# Patient Record
Sex: FEMALE | Race: WHITE | NOT HISPANIC OR LATINO | Employment: FULL TIME | URBAN - METROPOLITAN AREA
[De-identification: names, ages, dates, MRNs, and addresses within clinical notes are randomized per-mention and may not be internally consistent; named-entity substitution may affect disease eponyms.]

---

## 2023-01-29 PROBLEM — L25.9 CONTACT DERMATITIS AND ECZEMA: Status: ACTIVE | Noted: 2023-01-29

## 2023-01-29 PROBLEM — E55.9 VITAMIN D DEFICIENCY: Status: ACTIVE | Noted: 2023-01-29

## 2023-01-29 PROBLEM — K21.9 ESOPHAGEAL REFLUX: Status: ACTIVE | Noted: 2023-01-29

## 2023-01-29 PROBLEM — R60.0 BILATERAL EDEMA OF LOWER EXTREMITY: Status: ACTIVE | Noted: 2023-01-29

## 2023-01-29 PROBLEM — R73.01 IMPAIRED FASTING GLUCOSE: Status: ACTIVE | Noted: 2023-01-29

## 2023-01-29 PROBLEM — M17.11 PRIMARY OSTEOARTHRITIS OF RIGHT KNEE: Status: ACTIVE | Noted: 2023-01-29

## 2023-01-29 PROBLEM — R79.89 ELEVATED SERUM CREATININE: Status: ACTIVE | Noted: 2023-01-29

## 2023-01-29 PROBLEM — M65.30 TRIGGER FINGER, ACQUIRED: Status: ACTIVE | Noted: 2023-01-29

## 2023-01-29 PROBLEM — H53.9 VISION CHANGES: Status: ACTIVE | Noted: 2023-01-29

## 2023-01-29 PROBLEM — G25.81 RESTLESS LEGS SYNDROME: Status: ACTIVE | Noted: 2023-01-29

## 2023-01-29 PROBLEM — I10 HYPERTENSION: Status: ACTIVE | Noted: 2023-01-29

## 2023-01-29 PROBLEM — B00.9 HERPES SIMPLEX TYPE 1 INFECTION: Status: ACTIVE | Noted: 2023-01-29

## 2023-01-29 PROBLEM — N95.1 MENOPAUSAL SYMPTOMS: Status: ACTIVE | Noted: 2023-01-29

## 2023-01-29 RX ORDER — LOSARTAN POTASSIUM AND HYDROCHLOROTHIAZIDE 25; 100 MG/1; MG/1
1 TABLET ORAL DAILY
COMMUNITY
Start: 2020-04-17 | End: 2023-03-16 | Stop reason: SDUPTHER

## 2023-01-29 RX ORDER — ACETAMINOPHEN 500 MG
TABLET ORAL
COMMUNITY
End: 2023-03-16 | Stop reason: SDUPTHER

## 2023-01-29 RX ORDER — ROPINIROLE 2 MG/1
3 TABLET, FILM COATED ORAL DAILY
COMMUNITY
Start: 2012-01-23 | End: 2023-03-16 | Stop reason: SDUPTHER

## 2023-03-13 ENCOUNTER — APPOINTMENT (OUTPATIENT)
Dept: PRIMARY CARE | Facility: CLINIC | Age: 63
End: 2023-03-13
Payer: COMMERCIAL

## 2023-03-16 ENCOUNTER — OFFICE VISIT (OUTPATIENT)
Dept: PRIMARY CARE | Facility: CLINIC | Age: 63
End: 2023-03-16
Payer: COMMERCIAL

## 2023-03-16 VITALS
BODY MASS INDEX: 35.34 KG/M2 | HEART RATE: 85 BPM | OXYGEN SATURATION: 97 % | TEMPERATURE: 97 F | HEIGHT: 60 IN | SYSTOLIC BLOOD PRESSURE: 128 MMHG | RESPIRATION RATE: 16 BRPM | DIASTOLIC BLOOD PRESSURE: 84 MMHG | WEIGHT: 180 LBS

## 2023-03-16 DIAGNOSIS — Z00.00 HEALTHCARE MAINTENANCE: Primary | ICD-10-CM

## 2023-03-16 DIAGNOSIS — G25.81 RESTLESS LEGS SYNDROME: ICD-10-CM

## 2023-03-16 DIAGNOSIS — Z13.220 SCREENING FOR HYPERCHOLESTEROLEMIA: ICD-10-CM

## 2023-03-16 DIAGNOSIS — Z01.419 WELL WOMAN EXAM: ICD-10-CM

## 2023-03-16 DIAGNOSIS — B00.9 HSV-1 INFECTION: ICD-10-CM

## 2023-03-16 DIAGNOSIS — Z78.0 POST-MENOPAUSAL: ICD-10-CM

## 2023-03-16 DIAGNOSIS — Z11.4 ENCOUNTER FOR SCREENING FOR HIV: ICD-10-CM

## 2023-03-16 DIAGNOSIS — Z12.31 BREAST CANCER SCREENING BY MAMMOGRAM: ICD-10-CM

## 2023-03-16 DIAGNOSIS — Z12.11 COLON CANCER SCREENING: ICD-10-CM

## 2023-03-16 DIAGNOSIS — Z11.59 ENCOUNTER FOR HEPATITIS C SCREENING TEST FOR LOW RISK PATIENT: ICD-10-CM

## 2023-03-16 DIAGNOSIS — E55.9 VITAMIN D DEFICIENCY: ICD-10-CM

## 2023-03-16 DIAGNOSIS — B00.9 HERPES SIMPLEX TYPE 1 INFECTION: ICD-10-CM

## 2023-03-16 DIAGNOSIS — I10 PRIMARY HYPERTENSION: ICD-10-CM

## 2023-03-16 PROBLEM — N95.1 MENOPAUSAL SYMPTOMS: Status: RESOLVED | Noted: 2023-01-29 | Resolved: 2023-03-16

## 2023-03-16 PROBLEM — R79.89 ELEVATED SERUM CREATININE: Status: RESOLVED | Noted: 2023-01-29 | Resolved: 2023-03-16

## 2023-03-16 PROCEDURE — 3079F DIAST BP 80-89 MM HG: CPT

## 2023-03-16 PROCEDURE — 1036F TOBACCO NON-USER: CPT

## 2023-03-16 PROCEDURE — 3074F SYST BP LT 130 MM HG: CPT

## 2023-03-16 PROCEDURE — 99396 PREV VISIT EST AGE 40-64: CPT

## 2023-03-16 RX ORDER — ACYCLOVIR 50 MG/G
1 OINTMENT TOPICAL
COMMUNITY
End: 2023-03-16 | Stop reason: SDUPTHER

## 2023-03-16 RX ORDER — ROPINIROLE 2 MG/1
6 TABLET, FILM COATED ORAL NIGHTLY
Qty: 90 TABLET | Refills: 3 | Status: SHIPPED | OUTPATIENT
Start: 2023-03-16 | End: 2023-08-01 | Stop reason: SDUPTHER

## 2023-03-16 RX ORDER — ACYCLOVIR 50 MG/G
1 OINTMENT TOPICAL
Qty: 15 G | Refills: 2 | Status: SHIPPED | OUTPATIENT
Start: 2023-03-16

## 2023-03-16 RX ORDER — VALACYCLOVIR HYDROCHLORIDE 1 G/1
1000 TABLET, FILM COATED ORAL DAILY PRN
Qty: 5 TABLET | Refills: 3 | Status: SHIPPED | OUTPATIENT
Start: 2023-03-16 | End: 2024-03-19 | Stop reason: SDUPTHER

## 2023-03-16 RX ORDER — VALACYCLOVIR HYDROCHLORIDE 1 G/1
1000 TABLET, FILM COATED ORAL DAILY PRN
COMMUNITY
End: 2023-03-16 | Stop reason: SDUPTHER

## 2023-03-16 RX ORDER — ACETAMINOPHEN 500 MG
5000 TABLET ORAL DAILY
Qty: 90 TABLET | Refills: 3 | Status: SHIPPED | OUTPATIENT
Start: 2023-03-16 | End: 2023-09-19 | Stop reason: SDUPTHER

## 2023-03-16 RX ORDER — LOSARTAN POTASSIUM AND HYDROCHLOROTHIAZIDE 25; 100 MG/1; MG/1
1 TABLET ORAL DAILY
Qty: 90 TABLET | Refills: 3 | Status: SHIPPED | OUTPATIENT
Start: 2023-03-16 | End: 2023-09-19 | Stop reason: SDUPTHER

## 2023-03-16 ASSESSMENT — ENCOUNTER SYMPTOMS
ENDOCRINE NEGATIVE: 1
GASTROINTESTINAL NEGATIVE: 1
CARDIOVASCULAR NEGATIVE: 1
RESPIRATORY NEGATIVE: 1
MUSCULOSKELETAL NEGATIVE: 1
NEUROLOGICAL NEGATIVE: 1
PSYCHIATRIC NEGATIVE: 1
HEMATOLOGIC/LYMPHATIC NEGATIVE: 1
CONSTITUTIONAL NEGATIVE: 1
EYES NEGATIVE: 1

## 2023-03-16 ASSESSMENT — PAIN SCALES - GENERAL: PAINLEVEL: 0-NO PAIN

## 2023-03-16 NOTE — PATIENT INSTRUCTIONS
Thank you for coming to see me today.  If you have any questions or concerns following our visit, please contact the office.  Phone: [phone]    Follow up with me in 6 months or sooner as needed    1)  Increase your water intake- goal water intake is 64 oz per day. Decrease soda intake to 1-2 glasses per week at most    2) Increase regular exercise- if you're having trouble doing this, set a goal to walk twice per week for 10-15 minutes per session.  As time goes on increase days of the week and duration of exercise.  The American Heart Association recommends 150 minutes of moderate intensity exercise per week.     3) Please schedule a mammogram and bone density scan - please call [phone] or stop to 's office (in the lab office) on your way out today.     4) Please schedule a colonoscopy - please call [phone] or stop to 's office (in the lab office) on your way out today.     5) Get fasting labwork within the next week or two.  The lab is down the [place] from our office.     6) I am referring you to gynecology for well woman exam- please call [phone] to schedule an appointment or stop to 's office (in the lab) on your way out today

## 2023-03-16 NOTE — ASSESSMENT & PLAN NOTE
>>ASSESSMENT AND PLAN FOR HYPERTENSION WRITTEN ON 3/16/2023  3:59 PM BY EMMANUEL CARBAJAL, APRN-CNS    Continue losartan-hydrochlorothiazide 100-25 daily  Continue weekly blood pressures check

## 2023-03-16 NOTE — PROGRESS NOTES
Subjective   Patient ID: Radha Mcgee is a 62 y.o. female who presents for routine physical and to establish care.     Diet: Eating fish, chicken, veggies, fruit. Drinking 2 glasses soda at night. Ice cream twice per week, candy occasionally  Exercise: No regular exercise, signed up for exercise program but hasn't gone yet  Weight: Up 10 lbs over the past 6 months  Water: Drinking 1 glass water per day  Sleep: Good uninterrupted sleep about 8 hours per night  Social: , lives in 2 floor home, most everything on the first floor.   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    HENT: Negative.     Eyes: Negative.    Respiratory: Negative.     Cardiovascular: Negative.    Gastrointestinal: Negative.    Endocrine: Negative.    Genitourinary: Negative.    Musculoskeletal: Negative.    Skin: Negative.    Neurological: Negative.    Hematological: Negative.    Psychiatric/Behavioral: Negative.          Current Outpatient Medications   Medication Sig Dispense Refill    acyclovir (Zovirax) 5 % ointment Apply 1 Application topically 6 times a day. Space applications every 3 hours. 15 g 2    cholecalciferol (Vitamin D-3) 5,000 Units tablet Take 1 tablet (5,000 Units) by mouth once daily. 90 tablet 3    losartan-hydrochlorothiazide (Hyzaar) 100-25 mg tablet Take 1 tablet by mouth once daily. 90 tablet 3    rOPINIRole (Requip) 2 mg tablet Take 3 tablets (6 mg) by mouth once daily at bedtime. 90 tablet 3    valACYclovir (Valtrex) 1 gram tablet Take 1 tablet (1,000 mg) by mouth once daily as needed (HSV1 outbreak.). Take for 5 days for HSV outbreak 5 tablet 3     No current facility-administered medications for this visit.     Past Surgical History:   Procedure Laterality Date    CHOLECYSTECTOMY  03/28/2013    Cholecystectomy Laparoscopic    COLONOSCOPY  05/23/2013    Complete Colonoscopy    WISDOM TOOTH EXTRACTION       Family History   Problem Relation Name Age of Onset     "Hypertension Mother      Arthritis Mother      Anemia Mother      Cirrhosis Mother          primary biliary    Hypertension Father      Arthritis Father      Stroke Father      Hypertension Sister      Stroke Sister      Mental illness Daughter      Arthritis Maternal Grandmother      Arthritis Maternal Grandfather      Arthritis Paternal Grandmother      Arthritis Paternal Grandfather        Social History     Tobacco Use    Smoking status: Never    Smokeless tobacco: Never   Vaping Use    Vaping status: Never Used     Passive vaping exposure: Yes   Substance Use Topics    Alcohol use: Never    Drug use: Never        Objective     Visit Vitals  /84 (BP Location: Left arm, Patient Position: Sitting, BP Cuff Size: Adult)   Pulse 85   Temp 36.1 °C (97 °F) (Temporal)   Resp 16   Ht 1.53 m (5' 0.25\")   Wt 81.6 kg (180 lb)   SpO2 97%   BMI 34.86 kg/m²   Smoking Status Never   BSA 1.86 m²        Physical Exam  Constitutional:       Appearance: Normal appearance.   HENT:      Head: Normocephalic and atraumatic.   Eyes:      Extraocular Movements: Extraocular movements intact.      Pupils: Pupils are equal, round, and reactive to light.   Cardiovascular:      Rate and Rhythm: Normal rate and regular rhythm.   Pulmonary:      Effort: Pulmonary effort is normal.      Breath sounds: Normal breath sounds.   Abdominal:      General: Abdomen is flat. Bowel sounds are normal.      Palpations: Abdomen is soft.   Musculoskeletal:         General: Normal range of motion.   Neurological:      General: No focal deficit present.      Mental Status: She is alert and oriented to person, place, and time.   Psychiatric:         Mood and Affect: Mood normal.         Behavior: Behavior normal.           Assessment/Plan   Problem List Items Addressed This Visit       Herpes simplex type 1 infection     PRN Valtrex and acyclovir ointment prescribed today         Hypertension     Continue losartan-hydrochlorothiazide 100-25 daily  Continue " weekly blood pressures check         Relevant Medications    losartan-hydrochlorothiazide (Hyzaar) 100-25 mg tablet    Other Relevant Orders    Comprehensive Metabolic Panel    CBC    Restless legs syndrome     Well maintained on requip, continue         Relevant Medications    rOPINIRole (Requip) 2 mg tablet    Vitamin D deficiency    Relevant Medications    cholecalciferol (Vitamin D-3) 5,000 Units tablet    Other Relevant Orders    Vitamin D, Total     Other Visit Diagnoses       Post-menopausal    -  Primary    Relevant Orders    XR DEXA bone density    Encounter for screening for HIV        Relevant Orders    HIV-1 and HIV-2 antibodies    Breast cancer screening by mammogram        Relevant Orders    BI mammo bilateral screening tomosynthesis    Screening for hypercholesterolemia        Relevant Orders    Lipid Panel    Colon cancer screening        Relevant Orders    Colonoscopy    HSV-1 infection        Relevant Medications    acyclovir (Zovirax) 5 % ointment    valACYclovir (Valtrex) 1 gram tablet    Well woman exam        Relevant Orders    Referral to Gynecology          All pertinent lab work and results were reviewed with patient.     Follow up 6 months or sooner as needed    Mary Heart, APRN-CNS

## 2023-04-24 ENCOUNTER — HOSPITAL ENCOUNTER (OUTPATIENT)
Dept: DATA CONVERSION | Facility: HOSPITAL | Age: 63
End: 2023-04-24
Attending: INTERNAL MEDICINE | Admitting: INTERNAL MEDICINE
Payer: COMMERCIAL

## 2023-04-24 DIAGNOSIS — F12.90 CANNABIS USE, UNSPECIFIED, UNCOMPLICATED: ICD-10-CM

## 2023-04-24 DIAGNOSIS — M19.90 UNSPECIFIED OSTEOARTHRITIS, UNSPECIFIED SITE: ICD-10-CM

## 2023-04-24 DIAGNOSIS — K64.8 OTHER HEMORRHOIDS: ICD-10-CM

## 2023-04-24 DIAGNOSIS — F32.A DEPRESSION, UNSPECIFIED: ICD-10-CM

## 2023-04-24 DIAGNOSIS — K57.30 DIVERTICULOSIS OF LARGE INTESTINE WITHOUT PERFORATION OR ABSCESS WITHOUT BLEEDING: ICD-10-CM

## 2023-04-24 DIAGNOSIS — D12.4 BENIGN NEOPLASM OF DESCENDING COLON: ICD-10-CM

## 2023-04-24 DIAGNOSIS — I10 ESSENTIAL (PRIMARY) HYPERTENSION: ICD-10-CM

## 2023-04-24 DIAGNOSIS — E66.9 OBESITY, UNSPECIFIED: ICD-10-CM

## 2023-04-24 DIAGNOSIS — K21.9 GASTRO-ESOPHAGEAL REFLUX DISEASE WITHOUT ESOPHAGITIS: ICD-10-CM

## 2023-04-24 DIAGNOSIS — Z12.11 ENCOUNTER FOR SCREENING FOR MALIGNANT NEOPLASM OF COLON: ICD-10-CM

## 2023-04-24 DIAGNOSIS — G25.81 RESTLESS LEGS SYNDROME: ICD-10-CM

## 2023-05-02 LAB
COMPLETE PATHOLOGY REPORT: NORMAL
CONVERTED CLINICAL DIAGNOSIS-HISTORY: NORMAL
CONVERTED FINAL DIAGNOSIS: NORMAL
CONVERTED FINAL REPORT PDF LINK TO COPY AND PASTE: NORMAL
CONVERTED GROSS DESCRIPTION: NORMAL

## 2023-05-10 ENCOUNTER — TELEPHONE (OUTPATIENT)
Dept: PRIMARY CARE | Facility: CLINIC | Age: 63
End: 2023-05-10
Payer: COMMERCIAL

## 2023-05-10 NOTE — TELEPHONE ENCOUNTER
Insurance typically doesn't cover an A1c unless there's some evidence of high blood sugar on fasting labs from the past, which she fortunately and unfortunately hasn't had.  She should get fasting labwork with the orders that are in the system and if sugar is high we will then have the evidence to check A1c. If she wants to get A1c I can place the order but she may get a bill for it. If she's ok with that i'll go ahead and place.

## 2023-05-10 NOTE — TELEPHONE ENCOUNTER
Pt called and stated that she went to eye dr about blurriness, and she would like you to add lab work for diabetes to the ones she already has waiting for her to do.     The eye dr told her that she has vitreo retinal traction.     Please let me know when you do this, and I will call and let her know she can go get it done.

## 2023-05-12 NOTE — TELEPHONE ENCOUNTER
Called pt to tell her your message. She will just get the labs that are already ordered and see if her sugar is high and go from there.

## 2023-05-15 ENCOUNTER — LAB (OUTPATIENT)
Dept: LAB | Facility: LAB | Age: 63
End: 2023-05-15
Payer: COMMERCIAL

## 2023-05-15 DIAGNOSIS — Z11.4 ENCOUNTER FOR SCREENING FOR HIV: ICD-10-CM

## 2023-05-15 LAB
ALANINE AMINOTRANSFERASE (SGPT) (U/L) IN SER/PLAS: 19 U/L (ref 7–45)
ALBUMIN (G/DL) IN SER/PLAS: 4.1 G/DL (ref 3.4–5)
ALKALINE PHOSPHATASE (U/L) IN SER/PLAS: 58 U/L (ref 33–136)
ANION GAP IN SER/PLAS: 13 MMOL/L (ref 10–20)
ASPARTATE AMINOTRANSFERASE (SGOT) (U/L) IN SER/PLAS: 14 U/L (ref 9–39)
BILIRUBIN TOTAL (MG/DL) IN SER/PLAS: 0.6 MG/DL (ref 0–1.2)
CALCIDIOL (25 OH VITAMIN D3) (NG/ML) IN SER/PLAS: 61 NG/ML
CALCIUM (MG/DL) IN SER/PLAS: 9.6 MG/DL (ref 8.6–10.3)
CARBON DIOXIDE, TOTAL (MMOL/L) IN SER/PLAS: 28 MMOL/L (ref 21–32)
CHLORIDE (MMOL/L) IN SER/PLAS: 103 MMOL/L (ref 98–107)
CHOLESTEROL (MG/DL) IN SER/PLAS: 193 MG/DL (ref 0–199)
CHOLESTEROL IN HDL (MG/DL) IN SER/PLAS: 59.9 MG/DL
CHOLESTEROL/HDL RATIO: 3.2
CREATININE (MG/DL) IN SER/PLAS: 0.85 MG/DL (ref 0.5–1.05)
ERYTHROCYTE DISTRIBUTION WIDTH (RATIO) BY AUTOMATED COUNT: 14.2 % (ref 11.5–14.5)
ERYTHROCYTE MEAN CORPUSCULAR HEMOGLOBIN CONCENTRATION (G/DL) BY AUTOMATED: 32.8 G/DL (ref 32–36)
ERYTHROCYTE MEAN CORPUSCULAR VOLUME (FL) BY AUTOMATED COUNT: 86 FL (ref 80–100)
ERYTHROCYTES (10*6/UL) IN BLOOD BY AUTOMATED COUNT: 5.26 X10E12/L (ref 4–5.2)
GFR FEMALE: 78 ML/MIN/1.73M2
GLUCOSE (MG/DL) IN SER/PLAS: 92 MG/DL (ref 74–99)
HEMATOCRIT (%) IN BLOOD BY AUTOMATED COUNT: 45.4 % (ref 36–46)
HEMOGLOBIN (G/DL) IN BLOOD: 14.9 G/DL (ref 12–16)
LDL: 113 MG/DL (ref 0–99)
LEUKOCYTES (10*3/UL) IN BLOOD BY AUTOMATED COUNT: 8.4 X10E9/L (ref 4.4–11.3)
PLATELETS (10*3/UL) IN BLOOD AUTOMATED COUNT: 330 X10E9/L (ref 150–450)
POTASSIUM (MMOL/L) IN SER/PLAS: 3.6 MMOL/L (ref 3.5–5.3)
PROTEIN TOTAL: 6.5 G/DL (ref 6.4–8.2)
SODIUM (MMOL/L) IN SER/PLAS: 140 MMOL/L (ref 136–145)
TRIGLYCERIDE (MG/DL) IN SER/PLAS: 102 MG/DL (ref 0–149)
UREA NITROGEN (MG/DL) IN SER/PLAS: 18 MG/DL (ref 6–23)
VLDL: 20 MG/DL (ref 0–40)

## 2023-05-15 PROCEDURE — 80053 COMPREHEN METABOLIC PANEL: CPT

## 2023-05-15 PROCEDURE — 36415 COLL VENOUS BLD VENIPUNCTURE: CPT

## 2023-05-15 PROCEDURE — 82306 VITAMIN D 25 HYDROXY: CPT

## 2023-05-15 PROCEDURE — 80061 LIPID PANEL: CPT

## 2023-05-15 PROCEDURE — 85027 COMPLETE CBC AUTOMATED: CPT

## 2023-05-15 PROCEDURE — 87389 HIV-1 AG W/HIV-1&-2 AB AG IA: CPT

## 2023-05-15 PROCEDURE — 86803 HEPATITIS C AB TEST: CPT

## 2023-05-16 LAB
HEPATITIS C VIRUS AB PRESENCE IN SERUM: NONREACTIVE
HIV 1/ 2 AG/AB SCREEN: NONREACTIVE

## 2023-07-31 ENCOUNTER — TELEPHONE (OUTPATIENT)
Dept: PRIMARY CARE | Facility: CLINIC | Age: 63
End: 2023-07-31
Payer: COMMERCIAL

## 2023-07-31 DIAGNOSIS — G25.81 RESTLESS LEGS SYNDROME: ICD-10-CM

## 2023-07-31 NOTE — TELEPHONE ENCOUNTER
Pt is requesting a 90 day rx instead of a 30 day rx of ropinirole of 270 tablets instead of 90 tablets. Is this acceptable? Pt would like it to [facility] in [place].

## 2023-08-01 RX ORDER — ROPINIROLE 2 MG/1
6 TABLET, FILM COATED ORAL NIGHTLY
Qty: 270 TABLET | Refills: 2 | Status: SHIPPED | OUTPATIENT
Start: 2023-08-01 | End: 2023-09-19 | Stop reason: SDUPTHER

## 2023-09-07 VITALS — WEIGHT: 180.78 LBS | BODY MASS INDEX: 35.31 KG/M2

## 2023-09-19 ENCOUNTER — OFFICE VISIT (OUTPATIENT)
Dept: PRIMARY CARE | Facility: CLINIC | Age: 63
End: 2023-09-19
Payer: COMMERCIAL

## 2023-09-19 VITALS
OXYGEN SATURATION: 99 % | BODY MASS INDEX: 36.12 KG/M2 | DIASTOLIC BLOOD PRESSURE: 82 MMHG | SYSTOLIC BLOOD PRESSURE: 134 MMHG | HEIGHT: 60 IN | WEIGHT: 184 LBS | TEMPERATURE: 96.8 F | HEART RATE: 74 BPM | RESPIRATION RATE: 16 BRPM

## 2023-09-19 DIAGNOSIS — Z13.29 SCREENING FOR THYROID DISORDER: ICD-10-CM

## 2023-09-19 DIAGNOSIS — Z83.79 FAMILY HISTORY OF CHRONIC LIVER DISEASE: ICD-10-CM

## 2023-09-19 DIAGNOSIS — Z83.79 FAMILY HISTORY OF LIVER DISEASE: ICD-10-CM

## 2023-09-19 DIAGNOSIS — L25.9 CONTACT DERMATITIS AND ECZEMA: Primary | ICD-10-CM

## 2023-09-19 DIAGNOSIS — G25.81 RESTLESS LEGS SYNDROME: ICD-10-CM

## 2023-09-19 DIAGNOSIS — E55.9 VITAMIN D DEFICIENCY: ICD-10-CM

## 2023-09-19 DIAGNOSIS — I10 PRIMARY HYPERTENSION: ICD-10-CM

## 2023-09-19 PROCEDURE — 3008F BODY MASS INDEX DOCD: CPT

## 2023-09-19 PROCEDURE — 99214 OFFICE O/P EST MOD 30 MIN: CPT

## 2023-09-19 PROCEDURE — 3075F SYST BP GE 130 - 139MM HG: CPT

## 2023-09-19 PROCEDURE — 1036F TOBACCO NON-USER: CPT

## 2023-09-19 PROCEDURE — 3079F DIAST BP 80-89 MM HG: CPT

## 2023-09-19 RX ORDER — LOSARTAN POTASSIUM AND HYDROCHLOROTHIAZIDE 25; 100 MG/1; MG/1
1 TABLET ORAL DAILY
Qty: 90 TABLET | Refills: 3 | Status: SHIPPED | OUTPATIENT
Start: 2023-09-19 | End: 2024-03-19 | Stop reason: SDUPTHER

## 2023-09-19 RX ORDER — ROPINIROLE 2 MG/1
6 TABLET, FILM COATED ORAL NIGHTLY
Qty: 270 TABLET | Refills: 3 | Status: SHIPPED | OUTPATIENT
Start: 2023-09-19 | End: 2024-03-19 | Stop reason: SDUPTHER

## 2023-09-19 RX ORDER — ACETAMINOPHEN 500 MG
5000 TABLET ORAL DAILY
Qty: 90 TABLET | Refills: 3 | Status: SHIPPED | OUTPATIENT
Start: 2023-09-19 | End: 2024-03-19 | Stop reason: SDUPTHER

## 2023-09-19 RX ORDER — NYSTATIN 100000 [USP'U]/G
1 POWDER TOPICAL 4 TIMES DAILY
Qty: 60 G | Refills: 3 | Status: SHIPPED | OUTPATIENT
Start: 2023-09-19

## 2023-09-19 SDOH — ECONOMIC STABILITY: FOOD INSECURITY: WITHIN THE PAST 12 MONTHS, YOU WORRIED THAT YOUR FOOD WOULD RUN OUT BEFORE YOU GOT MONEY TO BUY MORE.: NEVER TRUE

## 2023-09-19 SDOH — ECONOMIC STABILITY: FOOD INSECURITY: WITHIN THE PAST 12 MONTHS, THE FOOD YOU BOUGHT JUST DIDN'T LAST AND YOU DIDN'T HAVE MONEY TO GET MORE.: NEVER TRUE

## 2023-09-19 ASSESSMENT — ENCOUNTER SYMPTOMS
CONSTITUTIONAL NEGATIVE: 1
RESPIRATORY NEGATIVE: 1
PSYCHIATRIC NEGATIVE: 1
CARDIOVASCULAR NEGATIVE: 1
NEUROLOGICAL NEGATIVE: 1
MUSCULOSKELETAL NEGATIVE: 1
ENDOCRINE NEGATIVE: 1
GASTROINTESTINAL NEGATIVE: 1
EYES NEGATIVE: 1
HEMATOLOGIC/LYMPHATIC NEGATIVE: 1

## 2023-09-19 ASSESSMENT — LIFESTYLE VARIABLES
AUDIT-C TOTAL SCORE: 0
HOW MANY STANDARD DRINKS CONTAINING ALCOHOL DO YOU HAVE ON A TYPICAL DAY: PATIENT DOES NOT DRINK
HOW OFTEN DO YOU HAVE A DRINK CONTAINING ALCOHOL: NEVER
HOW OFTEN DO YOU HAVE SIX OR MORE DRINKS ON ONE OCCASION: NEVER
SKIP TO QUESTIONS 9-10: 1

## 2023-09-19 ASSESSMENT — PATIENT HEALTH QUESTIONNAIRE - PHQ9
2. FEELING DOWN, DEPRESSED OR HOPELESS: NOT AT ALL
SUM OF ALL RESPONSES TO PHQ9 QUESTIONS 1 & 2: 0
1. LITTLE INTEREST OR PLEASURE IN DOING THINGS: NOT AT ALL

## 2023-09-19 ASSESSMENT — PAIN SCALES - GENERAL: PAINLEVEL: 0-NO PAIN

## 2023-09-19 NOTE — ASSESSMENT & PLAN NOTE
Blood pressure mildly elevated in office at 134/82, forgot to take her blood pressure medications the past 2 days  Home blood pressures are good    Continue losartan-hydrochlorothiazide 100-25 mg daily

## 2023-09-19 NOTE — PROGRESS NOTES
"Subjective   Patient ID: Radha Mcgee is a 62 y.o. female who presents for routine follow up.    Was recently seen by ophthalmology, believes she has cataracts, \"retinal fraction\". Will not do surgery due to eyes being 20/20 with glasses.     Diet: Eating fish, chicken, veggies, fruit. Drinking 2 glasses soda at night. Ice cream once every 2 weeks, candy occasionally  Exercise: No regular exercise, signed up for exercise program but cancelled it due to  losing her job. Planning to start walking  Weight: Up 10 lbs over the past 6 months  Water: Drinking 1 glass water per day  Sleep: Good uninterrupted sleep about 8 hours per night  Social: , lives in 2 floor home, most everything on the first floor.   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    HENT: Negative.     Eyes: Negative.    Respiratory: Negative.     Cardiovascular: Negative.    Gastrointestinal: Negative.    Endocrine: Negative.    Genitourinary: Negative.    Musculoskeletal: Negative.    Skin: Negative.    Neurological: Negative.    Hematological: Negative.    Psychiatric/Behavioral: Negative.          Current Outpatient Medications   Medication Sig Dispense Refill    acyclovir (Zovirax) 5 % ointment Apply 1 Application topically 6 times a day. Space applications every 3 hours. 15 g 2    valACYclovir (Valtrex) 1 gram tablet Take 1 tablet (1,000 mg) by mouth once daily as needed (HSV1 outbreak.). Take for 5 days for HSV outbreak 5 tablet 3    cholecalciferol (Vitamin D-3) 5,000 Units tablet Take 1 tablet (5,000 Units) by mouth once daily. 90 tablet 3    losartan-hydrochlorothiazide (Hyzaar) 100-25 mg tablet Take 1 tablet by mouth once daily. 90 tablet 3    nystatin (Mycostatin) 100,000 unit/gram powder Apply 1 Application topically 4 times a day. 60 g 3    rOPINIRole (Requip) 2 mg tablet Take 3 tablets (6 mg) by mouth once daily at bedtime. 270 tablet 3     No current facility-administered " medications for this visit.     Past Surgical History:   Procedure Laterality Date    CHOLECYSTECTOMY  03/28/2013    Cholecystectomy Laparoscopic    COLONOSCOPY  05/23/2013    Complete Colonoscopy    WISDOM TOOTH EXTRACTION       Family History   Problem Relation Name Age of Onset    Hypertension Mother      Arthritis Mother      Anemia Mother      Cirrhosis Mother          primary biliary    Hypertension Father      Arthritis Father      Stroke Father      Hypertension Sister      Stroke Sister      Mental illness Daughter      Arthritis Maternal Grandmother      Arthritis Maternal Grandfather      Arthritis Paternal Grandmother      Arthritis Paternal Grandfather        Social History     Tobacco Use    Smoking status: Never    Smokeless tobacco: Never   Vaping Use    Vaping Use: Never used   Substance Use Topics    Alcohol use: Never    Drug use: Never        Objective     Visit Vitals  /82 (BP Location: Right arm, Patient Position: Sitting, BP Cuff Size: Adult)   Pulse 74   Temp 36 °C (96.8 °F) (Temporal)   Resp 16   Ht 1.524 m (5')   Wt 83.5 kg (184 lb)   SpO2 99%   BMI 35.94 kg/m²   Smoking Status Never   BSA 1.88 m²        Physical Exam  Constitutional:       Appearance: Normal appearance.   HENT:      Head: Normocephalic and atraumatic.   Eyes:      Extraocular Movements: Extraocular movements intact.      Pupils: Pupils are equal, round, and reactive to light.   Cardiovascular:      Rate and Rhythm: Normal rate and regular rhythm.   Pulmonary:      Effort: Pulmonary effort is normal.      Breath sounds: Normal breath sounds.   Abdominal:      General: Abdomen is flat. Bowel sounds are normal.      Palpations: Abdomen is soft. There is no mass.      Tenderness: There is no abdominal tenderness.   Musculoskeletal:         General: Normal range of motion.   Neurological:      General: No focal deficit present.      Mental Status: She is alert and oriented to person, place, and time.   Psychiatric:          Mood and Affect: Mood normal.         Behavior: Behavior normal.           Assessment/Plan   Problem List Items Addressed This Visit       Contact dermatitis and eczema - Primary    Relevant Medications    nystatin (Mycostatin) 100,000 unit/gram powder    Hypertension     Blood pressure mildly elevated in office at 134/82, forgot to take her blood pressure medications the past 2 days  Home blood pressures are good    Continue losartan-hydrochlorothiazide 100-25 mg daily         Relevant Medications    losartan-hydrochlorothiazide (Hyzaar) 100-25 mg tablet    Other Relevant Orders    CBC    Comprehensive Metabolic Panel    Lipid Panel    Follow Up In Primary Care - Established    Restless legs syndrome    Relevant Medications    rOPINIRole (Requip) 2 mg tablet    Other Relevant Orders    Follow Up In Primary Care - Established    Vitamin D deficiency     Vitamin D normal at 62 when checked in 5/2023    Continue vitamin D supplement         Relevant Medications    cholecalciferol (Vitamin D-3) 5,000 Units tablet    Other Relevant Orders    Vitamin D 25-Hydroxy,Total (for eval of Vitamin D levels)    Family history of liver disease     Liver enzymes WNL  Check liver US due to concern for family history. If not covered by insurance can follow liver enzymes          Other Visit Diagnoses       Screening for thyroid disorder        Relevant Orders    TSH with reflex to Free T4 if abnormal    Follow Up In Primary Care - Established    Family history of chronic liver disease        Relevant Orders    US abdomen limited liver            All pertinent lab work and results were reviewed with patient.     Follow up with me in 6 months     TAYLER Lima-CNS

## 2023-09-19 NOTE — PATIENT INSTRUCTIONS
Thank you for coming to see me today.  If you have any questions or concerns following our visit, please contact the office.  Phone: [phone]    Follow up with me in 6 months or sooner as needed    1)  Get fasting labwork 3-5 days prior to next visit.  The lab is down the [address] from our office.     2)  Please schedule a liver US to be done before next visit - please call [phone] or stop to 's office (in the lab office) on your way out today.     3) Increase your water intake- goal water intake is 64 oz per day

## 2023-09-19 NOTE — ASSESSMENT & PLAN NOTE
Liver enzymes WNL  Check liver US due to concern for family history. If not covered by insurance can follow liver enzymes

## 2023-09-19 NOTE — ASSESSMENT & PLAN NOTE
>>ASSESSMENT AND PLAN FOR HYPERTENSION WRITTEN ON 9/19/2023  3:56 PM BY TAYLER CANALES-CNS    Blood pressure mildly elevated in office at 134/82, forgot to take her blood pressure medications the past 2 days  Home blood pressures are good    Continue losartan-hydrochlorothiazide 100-25 mg daily

## 2024-01-12 ENCOUNTER — TELEPHONE (OUTPATIENT)
Dept: PRIMARY CARE | Facility: CLINIC | Age: 64
End: 2024-01-12
Payer: COMMERCIAL

## 2024-01-12 NOTE — TELEPHONE ENCOUNTER
How long have you been sick? MONDAY MORNING   Cough (productive or nonproductive)? YES  Color of phlegm? YES, DOESN'T KNOW WHAT COLOR DIDN'T LOOK  Sinus pain? YES  Sinus drainage/color?N/A  Earache? NO   Congestion? YES  Headache? NO   Fever (if yes, temp)? NO   Sore throat? YES  Short of breath/wheezing? WHEEZING  OTC medication?  YES. BASICS AND TEA WITH HONEY  LOV: 9/19/23  NOV: 3/19/24  PATIENT STATES SHE STARTED WITH LARYNGITIS ON MONDAY     HAS NOT TESTED FOR COVID. ADVISED TO TEST FOR COVID AND CALL BACK.

## 2024-01-15 ENCOUNTER — APPOINTMENT (OUTPATIENT)
Dept: PRIMARY CARE | Facility: CLINIC | Age: 64
End: 2024-01-15
Payer: COMMERCIAL

## 2024-01-17 ENCOUNTER — TELEMEDICINE (OUTPATIENT)
Dept: PRIMARY CARE | Facility: CLINIC | Age: 64
End: 2024-01-17
Payer: COMMERCIAL

## 2024-01-17 VITALS — BODY MASS INDEX: 35.94 KG/M2 | WEIGHT: 184 LBS

## 2024-01-17 DIAGNOSIS — B96.89 BACTERIAL SINUSITIS: Primary | ICD-10-CM

## 2024-01-17 DIAGNOSIS — J32.9 BACTERIAL SINUSITIS: Primary | ICD-10-CM

## 2024-01-17 PROCEDURE — 99213 OFFICE O/P EST LOW 20 MIN: CPT

## 2024-01-17 RX ORDER — BENZONATATE 100 MG/1
100 CAPSULE ORAL 3 TIMES DAILY PRN
COMMUNITY
Start: 2024-01-14

## 2024-01-17 RX ORDER — ALBUTEROL SULFATE 90 UG/1
2 AEROSOL, METERED RESPIRATORY (INHALATION) EVERY 4 HOURS PRN
COMMUNITY
Start: 2024-01-14 | End: 2024-03-19 | Stop reason: SDUPTHER

## 2024-01-17 RX ORDER — AMOXICILLIN AND CLAVULANATE POTASSIUM 875; 125 MG/1; MG/1
1 TABLET, FILM COATED ORAL 2 TIMES DAILY
Qty: 14 TABLET | Refills: 0 | Status: SHIPPED | OUTPATIENT
Start: 2024-01-17 | End: 2024-01-24

## 2024-01-17 RX ORDER — AMOXICILLIN AND CLAVULANATE POTASSIUM 875; 125 MG/1; MG/1
1 TABLET, FILM COATED ORAL 2 TIMES DAILY
COMMUNITY
Start: 2024-01-14 | End: 2024-01-17 | Stop reason: SDUPTHER

## 2024-01-17 SDOH — ECONOMIC STABILITY: FOOD INSECURITY: WITHIN THE PAST 12 MONTHS, YOU WORRIED THAT YOUR FOOD WOULD RUN OUT BEFORE YOU GOT MONEY TO BUY MORE.: NEVER TRUE

## 2024-01-17 SDOH — ECONOMIC STABILITY: FOOD INSECURITY: WITHIN THE PAST 12 MONTHS, THE FOOD YOU BOUGHT JUST DIDN'T LAST AND YOU DIDN'T HAVE MONEY TO GET MORE.: NEVER TRUE

## 2024-01-17 ASSESSMENT — LIFESTYLE VARIABLES
AUDIT-C TOTAL SCORE: 0
HOW OFTEN DO YOU HAVE SIX OR MORE DRINKS ON ONE OCCASION: NEVER
HOW MANY STANDARD DRINKS CONTAINING ALCOHOL DO YOU HAVE ON A TYPICAL DAY: PATIENT DOES NOT DRINK
HOW OFTEN DO YOU HAVE A DRINK CONTAINING ALCOHOL: NEVER
SKIP TO QUESTIONS 9-10: 1

## 2024-01-17 ASSESSMENT — PATIENT HEALTH QUESTIONNAIRE - PHQ9
SUM OF ALL RESPONSES TO PHQ9 QUESTIONS 1 & 2: 0
1. LITTLE INTEREST OR PLEASURE IN DOING THINGS: NOT AT ALL
2. FEELING DOWN, DEPRESSED OR HOPELESS: NOT AT ALL

## 2024-01-17 ASSESSMENT — ENCOUNTER SYMPTOMS
RESPIRATORY NEGATIVE: 1
MUSCULOSKELETAL NEGATIVE: 1
HEMATOLOGIC/LYMPHATIC NEGATIVE: 1
NEUROLOGICAL NEGATIVE: 1
EYES NEGATIVE: 1
CARDIOVASCULAR NEGATIVE: 1
GASTROINTESTINAL NEGATIVE: 1
VOICE CHANGE: 1
CONSTITUTIONAL NEGATIVE: 1
PSYCHIATRIC NEGATIVE: 1
RHINORRHEA: 1
SORE THROAT: 1
ENDOCRINE NEGATIVE: 1

## 2024-01-17 NOTE — ASSESSMENT & PLAN NOTE
Encouraged to try [employer] to track exercise and intake   Encouraged to continue drinking lots of water  Consider Wegovy at follow up visit

## 2024-01-17 NOTE — PROGRESS NOTES
Subjective   Patient ID: Radha Mcgee is a 63 y.o. female who presents for upper respiratory issu.    Day started with sore throat and laryngitis, Tuesday was the same, Wednesday started into chest congestion, Thursday an dFriday started head congestion/sinus congestion with clear productive drainage. On Friday was supposed to see Dr. Romano on Monday but symptoms were so bad she was seen by urgent care provider on Sunday. Got home around noon and started medications but still not better.  Started on augmentin twice daily which didn't help. Was advised to try flu HBP which didn't help symptoms. Started guiafenesin 1200mg twice daily with water. Still sick. Still having a lot of darinage, coughing and phlegm production, also using humidifier not helping with symptoms. No flonase, used it once a long time ago and didn't like how it made her feel. No saline nasal rinses. Has one but doesn't care to use it. Cough worse at night.   Tessalon pearls help with sleep and cough but not working anymore. Inhaler she was given helps but only lasts a few hours, then starts to cough again.   Reports several coworkers remain ill with similar symptoms.      Review of Systems   Constitutional: Negative.    HENT:  Positive for congestion, rhinorrhea, sore throat and voice change.    Eyes: Negative.    Respiratory: Negative.     Cardiovascular: Negative.    Gastrointestinal: Negative.    Endocrine: Negative.    Genitourinary: Negative.    Musculoskeletal: Negative.    Skin: Negative.    Neurological: Negative.    Hematological: Negative.    Psychiatric/Behavioral: Negative.          Current Outpatient Medications   Medication Sig Dispense Refill    acyclovir (Zovirax) 5 % ointment Apply 1 Application topically 6 times a day. Space applications every 3 hours. 15 g 2    albuterol 90 mcg/actuation inhaler Inhale 2 puffs every 4 hours if needed for wheezing or shortness of breath.      benzonatate (Tessalon) 100 mg capsule Take 1 capsule  (100 mg) by mouth 3 times a day as needed for cough.      cholecalciferol (Vitamin D-3) 5,000 Units tablet Take 1 tablet (5,000 Units) by mouth once daily. 90 tablet 3    losartan-hydrochlorothiazide (Hyzaar) 100-25 mg tablet Take 1 tablet by mouth once daily. 90 tablet 3    nystatin (Mycostatin) 100,000 unit/gram powder Apply 1 Application topically 4 times a day. 60 g 3    rOPINIRole (Requip) 2 mg tablet Take 3 tablets (6 mg) by mouth once daily at bedtime. 270 tablet 3    valACYclovir (Valtrex) 1 gram tablet Take 1 tablet (1,000 mg) by mouth once daily as needed (HSV1 outbreak.). Take for 5 days for HSV outbreak 5 tablet 3    amoxicillin-pot clavulanate (Augmentin) 875-125 mg tablet Take 1 tablet (875 mg) by mouth 2 times a day for 7 days. 14 tablet 0     No current facility-administered medications for this visit.     Past Surgical History:   Procedure Laterality Date    CHOLECYSTECTOMY  03/28/2013    Cholecystectomy Laparoscopic    COLONOSCOPY  05/23/2013    Complete Colonoscopy    WISDOM TOOTH EXTRACTION       Family History   Problem Relation Name Age of Onset    Hypertension Mother      Arthritis Mother      Anemia Mother      Cirrhosis Mother          primary biliary    Hypertension Father      Arthritis Father      Stroke Father      Hypertension Sister      Stroke Sister      Mental illness Daughter      Arthritis Maternal Grandmother      Arthritis Maternal Grandfather      Arthritis Paternal Grandmother      Arthritis Paternal Grandfather        Social History     Tobacco Use    Smoking status: Never    Smokeless tobacco: Never   Vaping Use    Vaping Use: Never used   Substance Use Topics    Alcohol use: Never    Drug use: Never        Objective     Visit Vitals  Wt 83.5 kg (184 lb)   BMI 35.94 kg/m²   Smoking Status Never   BSA 1.88 m²        Physical Exam      Assessment/Plan   Problem List Items Addressed This Visit       BMI 35.0-35.9,adult     Encouraged to try [employer] to track exercise and  intake   Encouraged to continue drinking lots of water  Consider Wegovy at follow up visit           Other Visit Diagnoses       Bacterial sinusitis    -  Primary    persistent URI symptoms despite 1 week Augmentin   Add additional week of Abx  Start flonase and sinus rinse twice daily    Relevant Medications    amoxicillin-pot clavulanate (Augmentin) 875-125 mg tablet            All pertinent lab work and results were reviewed with patient.     Follow up with me as scheduled in March    TAYLER Lima-CNS

## 2024-01-17 NOTE — TELEPHONE ENCOUNTER
Pt went to  on Sunday and was given albuterol inhaler, amoxicillin clavulanate, benzonatate. Pt was not dx. Pt isn't feeling any better and feels very congested and has a productive cough with clear phlegm and is wheezing still. Pt is not sure what else she can do or take? Pt is almost out of the medications. Pt is also taking guaifenesin OTC. Please advise.

## 2024-03-14 ENCOUNTER — TELEPHONE (OUTPATIENT)
Dept: PRIMARY CARE | Facility: CLINIC | Age: 64
End: 2024-03-14
Payer: COMMERCIAL

## 2024-03-14 DIAGNOSIS — Z12.31 BREAST CANCER SCREENING BY MAMMOGRAM: Primary | ICD-10-CM

## 2024-03-14 NOTE — TELEPHONE ENCOUNTER
Pt called in requesting an order for a mammogram, pt would like this completed before upcoming appointment. Please advise.

## 2024-03-14 NOTE — TELEPHONE ENCOUNTER
Called patient, notified of message, patient expressed verbal understanding had no questions at this time. Scheduled patient for 245pm tomorrow.

## 2024-03-15 ENCOUNTER — HOSPITAL ENCOUNTER (OUTPATIENT)
Dept: RADIOLOGY | Facility: CLINIC | Age: 64
Discharge: HOME | End: 2024-03-15
Payer: COMMERCIAL

## 2024-03-15 ENCOUNTER — LAB (OUTPATIENT)
Dept: LAB | Facility: LAB | Age: 64
End: 2024-03-15
Payer: COMMERCIAL

## 2024-03-15 DIAGNOSIS — I10 PRIMARY HYPERTENSION: ICD-10-CM

## 2024-03-15 DIAGNOSIS — R25.2 LEG CRAMP: ICD-10-CM

## 2024-03-15 DIAGNOSIS — Z13.29 SCREENING FOR THYROID DISORDER: ICD-10-CM

## 2024-03-15 DIAGNOSIS — E55.9 VITAMIN D DEFICIENCY: ICD-10-CM

## 2024-03-15 DIAGNOSIS — G25.81 RESTLESS LEGS SYNDROME: ICD-10-CM

## 2024-03-15 DIAGNOSIS — Z12.31 BREAST CANCER SCREENING BY MAMMOGRAM: ICD-10-CM

## 2024-03-15 LAB
25(OH)D3 SERPL-MCNC: 93 NG/ML (ref 30–100)
ALBUMIN SERPL BCP-MCNC: 4.5 G/DL (ref 3.4–5)
ALP SERPL-CCNC: 60 U/L (ref 33–136)
ALT SERPL W P-5'-P-CCNC: 18 U/L (ref 7–45)
ANION GAP SERPL CALC-SCNC: 14 MMOL/L (ref 10–20)
AST SERPL W P-5'-P-CCNC: 14 U/L (ref 9–39)
BILIRUB SERPL-MCNC: 0.8 MG/DL (ref 0–1.2)
BUN SERPL-MCNC: 34 MG/DL (ref 6–23)
CALCIUM SERPL-MCNC: 10 MG/DL (ref 8.6–10.3)
CHLORIDE SERPL-SCNC: 100 MMOL/L (ref 98–107)
CHOLEST SERPL-MCNC: 169 MG/DL (ref 0–199)
CHOLESTEROL/HDL RATIO: 3.6
CO2 SERPL-SCNC: 28 MMOL/L (ref 21–32)
CREAT SERPL-MCNC: 1.35 MG/DL (ref 0.5–1.05)
EGFRCR SERPLBLD CKD-EPI 2021: 45 ML/MIN/1.73M*2
ERYTHROCYTE [DISTWIDTH] IN BLOOD BY AUTOMATED COUNT: 14.1 % (ref 11.5–14.5)
GLUCOSE SERPL-MCNC: 98 MG/DL (ref 74–99)
HCT VFR BLD AUTO: 46.2 % (ref 36–46)
HDLC SERPL-MCNC: 46.5 MG/DL
HGB BLD-MCNC: 15.3 G/DL (ref 12–16)
LDLC SERPL CALC-MCNC: 108 MG/DL
MCH RBC QN AUTO: 27.8 PG (ref 26–34)
MCHC RBC AUTO-ENTMCNC: 33.1 G/DL (ref 32–36)
MCV RBC AUTO: 84 FL (ref 80–100)
NON HDL CHOLESTEROL: 123 MG/DL (ref 0–149)
NRBC BLD-RTO: 0 /100 WBCS (ref 0–0)
PLATELET # BLD AUTO: 308 X10*3/UL (ref 150–450)
POTASSIUM SERPL-SCNC: 3.3 MMOL/L (ref 3.5–5.3)
PROT SERPL-MCNC: 7 G/DL (ref 6.4–8.2)
RBC # BLD AUTO: 5.51 X10*6/UL (ref 4–5.2)
SODIUM SERPL-SCNC: 139 MMOL/L (ref 136–145)
TRIGL SERPL-MCNC: 75 MG/DL (ref 0–149)
TSH SERPL-ACNC: 1.87 MIU/L (ref 0.44–3.98)
VLDL: 15 MG/DL (ref 0–40)
WBC # BLD AUTO: 8.4 X10*3/UL (ref 4.4–11.3)

## 2024-03-15 PROCEDURE — 36415 COLL VENOUS BLD VENIPUNCTURE: CPT

## 2024-03-15 PROCEDURE — 77067 SCR MAMMO BI INCL CAD: CPT | Performed by: RADIOLOGY

## 2024-03-15 PROCEDURE — 82306 VITAMIN D 25 HYDROXY: CPT

## 2024-03-15 PROCEDURE — 83550 IRON BINDING TEST: CPT

## 2024-03-15 PROCEDURE — 82728 ASSAY OF FERRITIN: CPT

## 2024-03-15 PROCEDURE — 80061 LIPID PANEL: CPT

## 2024-03-15 PROCEDURE — 83540 ASSAY OF IRON: CPT

## 2024-03-15 PROCEDURE — 80053 COMPREHEN METABOLIC PANEL: CPT

## 2024-03-15 PROCEDURE — 77067 SCR MAMMO BI INCL CAD: CPT

## 2024-03-15 PROCEDURE — 77063 BREAST TOMOSYNTHESIS BI: CPT | Performed by: RADIOLOGY

## 2024-03-15 PROCEDURE — 85027 COMPLETE CBC AUTOMATED: CPT

## 2024-03-15 PROCEDURE — 83735 ASSAY OF MAGNESIUM: CPT

## 2024-03-15 PROCEDURE — 84443 ASSAY THYROID STIM HORMONE: CPT

## 2024-03-19 ENCOUNTER — OFFICE VISIT (OUTPATIENT)
Dept: PRIMARY CARE | Facility: CLINIC | Age: 64
End: 2024-03-19
Payer: COMMERCIAL

## 2024-03-19 VITALS
HEART RATE: 69 BPM | DIASTOLIC BLOOD PRESSURE: 81 MMHG | TEMPERATURE: 96.9 F | HEIGHT: 60 IN | BODY MASS INDEX: 33.34 KG/M2 | RESPIRATION RATE: 20 BRPM | WEIGHT: 169.8 LBS | OXYGEN SATURATION: 99 % | SYSTOLIC BLOOD PRESSURE: 117 MMHG

## 2024-03-19 DIAGNOSIS — R25.2 LEG CRAMP: ICD-10-CM

## 2024-03-19 DIAGNOSIS — E87.6 HYPOKALEMIA: ICD-10-CM

## 2024-03-19 DIAGNOSIS — R60.0 LOWER EXTREMITY EDEMA: ICD-10-CM

## 2024-03-19 DIAGNOSIS — E55.9 VITAMIN D DEFICIENCY: ICD-10-CM

## 2024-03-19 DIAGNOSIS — I10 PRIMARY HYPERTENSION: ICD-10-CM

## 2024-03-19 DIAGNOSIS — R60.0 BILATERAL EDEMA OF LOWER EXTREMITY: ICD-10-CM

## 2024-03-19 DIAGNOSIS — G25.81 RESTLESS LEGS SYNDROME: ICD-10-CM

## 2024-03-19 DIAGNOSIS — T78.40XA ALLERGY, INITIAL ENCOUNTER: Primary | ICD-10-CM

## 2024-03-19 DIAGNOSIS — I10 ESSENTIAL HYPERTENSION: ICD-10-CM

## 2024-03-19 DIAGNOSIS — Z13.29 SCREENING FOR THYROID DISORDER: ICD-10-CM

## 2024-03-19 DIAGNOSIS — B00.9 HSV-1 INFECTION: ICD-10-CM

## 2024-03-19 LAB
FERRITIN SERPL-MCNC: 197 NG/ML (ref 8–150)
IRON SATN MFR SERPL: 14 % (ref 25–45)
IRON SERPL-MCNC: 52 UG/DL (ref 35–150)
MAGNESIUM SERPL-MCNC: 1.89 MG/DL (ref 1.6–2.4)
TIBC SERPL-MCNC: 378 UG/DL (ref 240–445)
UIBC SERPL-MCNC: 326 UG/DL (ref 110–370)

## 2024-03-19 PROCEDURE — 1036F TOBACCO NON-USER: CPT

## 2024-03-19 PROCEDURE — 3074F SYST BP LT 130 MM HG: CPT

## 2024-03-19 PROCEDURE — 99214 OFFICE O/P EST MOD 30 MIN: CPT

## 2024-03-19 PROCEDURE — 3008F BODY MASS INDEX DOCD: CPT

## 2024-03-19 PROCEDURE — 3079F DIAST BP 80-89 MM HG: CPT

## 2024-03-19 RX ORDER — ROPINIROLE 2 MG/1
6 TABLET, FILM COATED ORAL NIGHTLY
Qty: 270 TABLET | Refills: 3 | Status: SHIPPED | OUTPATIENT
Start: 2024-03-19

## 2024-03-19 RX ORDER — SEMAGLUTIDE 0.5 MG/.5ML
0.5 INJECTION, SOLUTION SUBCUTANEOUS
Qty: 2 ML | Refills: 0 | Status: CANCELLED | OUTPATIENT
Start: 2024-03-19 | End: 2024-04-10

## 2024-03-19 RX ORDER — POTASSIUM CHLORIDE 750 MG/1
10 TABLET, FILM COATED, EXTENDED RELEASE ORAL 2 TIMES DAILY
Qty: 30 TABLET | Refills: 11 | Status: SHIPPED | OUTPATIENT
Start: 2024-03-19 | End: 2024-04-15 | Stop reason: SDUPTHER

## 2024-03-19 RX ORDER — VALACYCLOVIR HYDROCHLORIDE 1 G/1
1000 TABLET, FILM COATED ORAL DAILY PRN
Qty: 5 TABLET | Refills: 3 | Status: SHIPPED | OUTPATIENT
Start: 2024-03-19

## 2024-03-19 RX ORDER — ALBUTEROL SULFATE 90 UG/1
2 AEROSOL, METERED RESPIRATORY (INHALATION) EVERY 4 HOURS PRN
Qty: 18 G | Refills: 1 | Status: SHIPPED | OUTPATIENT
Start: 2024-03-19

## 2024-03-19 RX ORDER — ACETAMINOPHEN 500 MG
5000 TABLET ORAL EVERY OTHER DAY
Qty: 45 TABLET | Refills: 3 | Status: SHIPPED | OUTPATIENT
Start: 2024-03-19

## 2024-03-19 RX ORDER — ACETAMINOPHEN 500 MG
5000 TABLET ORAL DAILY
Qty: 90 TABLET | Refills: 3 | Status: SHIPPED | OUTPATIENT
Start: 2024-03-19 | End: 2024-03-19 | Stop reason: SDUPTHER

## 2024-03-19 RX ORDER — LOSARTAN POTASSIUM AND HYDROCHLOROTHIAZIDE 25; 100 MG/1; MG/1
1 TABLET ORAL DAILY
Qty: 90 TABLET | Refills: 3 | Status: SHIPPED | OUTPATIENT
Start: 2024-03-19

## 2024-03-19 SDOH — ECONOMIC STABILITY: FOOD INSECURITY: WITHIN THE PAST 12 MONTHS, THE FOOD YOU BOUGHT JUST DIDN'T LAST AND YOU DIDN'T HAVE MONEY TO GET MORE.: NEVER TRUE

## 2024-03-19 SDOH — ECONOMIC STABILITY: FOOD INSECURITY: WITHIN THE PAST 12 MONTHS, YOU WORRIED THAT YOUR FOOD WOULD RUN OUT BEFORE YOU GOT MONEY TO BUY MORE.: NEVER TRUE

## 2024-03-19 ASSESSMENT — LIFESTYLE VARIABLES
HOW OFTEN DO YOU HAVE SIX OR MORE DRINKS ON ONE OCCASION: NEVER
SKIP TO QUESTIONS 9-10: 1
AUDIT-C TOTAL SCORE: 0
HOW OFTEN DO YOU HAVE A DRINK CONTAINING ALCOHOL: NEVER
HOW MANY STANDARD DRINKS CONTAINING ALCOHOL DO YOU HAVE ON A TYPICAL DAY: PATIENT DOES NOT DRINK

## 2024-03-19 ASSESSMENT — PAIN SCALES - GENERAL: PAINLEVEL: 0-NO PAIN

## 2024-03-19 ASSESSMENT — ENCOUNTER SYMPTOMS
CONSTITUTIONAL NEGATIVE: 1
RESPIRATORY NEGATIVE: 1
OCCASIONAL FEELINGS OF UNSTEADINESS: 0
GASTROINTESTINAL NEGATIVE: 1
CARDIOVASCULAR NEGATIVE: 1
MUSCULOSKELETAL NEGATIVE: 1
NEUROLOGICAL NEGATIVE: 1
EYES NEGATIVE: 1
PSYCHIATRIC NEGATIVE: 1
LOSS OF SENSATION IN FEET: 0
HEMATOLOGIC/LYMPHATIC NEGATIVE: 1
ENDOCRINE NEGATIVE: 1
DEPRESSION: 0

## 2024-03-19 NOTE — PATIENT INSTRUCTIONS
Thank you for coming to see me today.  If you have any questions or concerns following our visit, please contact the office.  Phone: [phone]    Follow up with me in 3 months or sooner as needed    1)  STAT potassium chloride 10 mEq daily. Do not take more than 2 tablets per day    2) Get non-fasting labwork in 2 weeks.  The lab is down the [place] from our office.     3) Increase your water intake- goal water intake is 64 oz per day     4) DECREASE vitamin D to every other day    5) I will discuss TOMAC therapy for RLS with our [employer] rep

## 2024-03-19 NOTE — PROGRESS NOTES
Subjective   Patient ID: Radha Mcgee is a 63 y.o. female who presents for follow up of restless leg syndrome, hypertension and weight management.    Reporting she is now tracking her diet, lowering portions, adding more veggies and fruits to diet.   Doing well with albuterol and using as needed for shortness of breath which is very helpful.     Diet: Eating fish, chicken, veggies, fruit. Drinking 2 glasses soda at night. Ice cream once every 2 weeks, candy occasionally  Exercise: No regular exercise, signed up for exercise program but cancelled it due to  losing her job. Planning to start walking  Weight:Down 15 lbs over the last 6 months planned  Water: Drinking 1 glass water per day  Sleep: Good uninterrupted sleep about 8 hours per night  Social: , lives in 2 floor home, most everything on the first floor.   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    HENT: Negative.     Eyes: Negative.    Respiratory: Negative.     Cardiovascular: Negative.    Gastrointestinal: Negative.    Endocrine: Negative.    Genitourinary: Negative.    Musculoskeletal: Negative.    Skin: Negative.    Neurological: Negative.    Hematological: Negative.    Psychiatric/Behavioral: Negative.          Current Outpatient Medications   Medication Sig Dispense Refill    acyclovir (Zovirax) 5 % ointment Apply 1 Application topically 6 times a day. Space applications every 3 hours. 15 g 2    nystatin (Mycostatin) 100,000 unit/gram powder Apply 1 Application topically 4 times a day. 60 g 3    albuterol 90 mcg/actuation inhaler Inhale 2 puffs every 4 hours if needed for wheezing or shortness of breath. 18 g 1    benzonatate (Tessalon) 100 mg capsule Take 1 capsule (100 mg) by mouth 3 times a day as needed for cough.      cholecalciferol (Vitamin D-3) 5,000 Units tablet Take 1 tablet (5,000 Units) by mouth every other day. 45 tablet 3    compression panty, 1x-2x misc 1 Units once  daily. 2 each 2    losartan-hydrochlorothiazide (Hyzaar) 100-25 mg tablet Take 1 tablet by mouth once daily. 90 tablet 3    potassium chloride CR (Klor-Con) 10 mEq ER tablet Take 1 tablet (10 mEq) by mouth 2 times a day. Do not crush, chew, or split. 30 tablet 11    rOPINIRole (Requip) 2 mg tablet Take 3 tablets (6 mg) by mouth once daily at bedtime. 270 tablet 3    valACYclovir (Valtrex) 1 gram tablet Take 1 tablet (1,000 mg) by mouth once daily as needed (HSV1 outbreak.). Take for 5 days for HSV outbreak 5 tablet 3     No current facility-administered medications for this visit.     Past Surgical History:   Procedure Laterality Date    CHOLECYSTECTOMY  03/28/2013    Cholecystectomy Laparoscopic    COLONOSCOPY  05/23/2013    Complete Colonoscopy    WISDOM TOOTH EXTRACTION       Family History   Problem Relation Name Age of Onset    Hypertension Mother      Arthritis Mother      Anemia Mother      Cirrhosis Mother          primary biliary    Hypertension Father      Arthritis Father      Stroke Father      Hypertension Sister      Stroke Sister      Mental illness Daughter      Arthritis Maternal Grandmother      Arthritis Maternal Grandfather      Arthritis Paternal Grandmother      Arthritis Paternal Grandfather        Social History     Tobacco Use    Smoking status: Never     Passive exposure: Never    Smokeless tobacco: Never   Vaping Use    Vaping Use: Never used   Substance Use Topics    Alcohol use: Never    Drug use: Never        Objective     Visit Vitals  /81 (BP Location: Left arm, Patient Position: Sitting, BP Cuff Size: Adult)   Pulse 69   Temp 36.1 °C (96.9 °F)   Resp 20   Ht 1.524 m (5')   Wt 77 kg (169 lb 12.8 oz)   SpO2 99%   BMI 33.16 kg/m²   Smoking Status Never   BSA 1.81 m²        Physical Exam  Constitutional:       Appearance: Normal appearance.   HENT:      Head: Normocephalic and atraumatic.   Eyes:      Extraocular Movements: Extraocular movements intact.      Pupils: Pupils are  equal, round, and reactive to light.   Musculoskeletal:         General: Normal range of motion.   Skin:     General: Skin is warm and dry.      Capillary Refill: Capillary refill takes less than 2 seconds.   Neurological:      General: No focal deficit present.      Mental Status: She is alert and oriented to person, place, and time.   Psychiatric:         Mood and Affect: Mood normal.         Behavior: Behavior normal.           Assessment/Plan   Problem List Items Addressed This Visit       Bilateral edema of lower extremity     Unable to wear knee high compression stockings due to body structure  Continues to have 1+ bilateral lower extremity edema     Will have her trial pantyhose style compression stockings which she thinks will be a better option         Restless legs syndrome     Having severe drowsiness with ropinirole, can't drive while taking this medication. Not happy with this inhibition and feels like she is developing tolerance to medication at max dose    Interested in Tonic motor activation therapy  Will discuss with DME rep to see where to obtain equipment  Check iron studies to verify lack of deficiency  Correct hypokalemia with Kdur 10mEq daily         Relevant Medications    rOPINIRole (Requip) 2 mg tablet    Other Relevant Orders    Iron and TIBC (Completed)    Ferritin (Completed)    Vitamin D deficiency     Vitamin D high at 80 on labs from 3/2024    Decrease vitamin D 5000 units every other day         Relevant Medications    cholecalciferol (Vitamin D-3) 5,000 Units tablet    Essential hypertension     Normotensive in office at 117/81    Continue losartan -hydrochlorothiazide 100-25mg daily         Hypokalemia     K low at 3.3    Start Kdur 10mEq daily  Repeat RFP in 2 weeks         Relevant Medications    potassium chloride CR (Klor-Con) 10 mEq ER tablet    Other Relevant Orders    Renal function panel     Other Visit Diagnoses       Allergy, initial encounter    -  Primary    Relevant  Medications    albuterol 90 mcg/actuation inhaler    Primary hypertension        Relevant Medications    losartan-hydrochlorothiazide (Hyzaar) 100-25 mg tablet    Other Relevant Orders    Renal function panel    Screening for thyroid disorder        HSV-1 infection        Relevant Medications    valACYclovir (Valtrex) 1 gram tablet    Lower extremity edema        Relevant Medications    compression panty, 1x-2x misc    Leg cramp        Relevant Orders    Magnesium (Completed)            All pertinent lab work and results were reviewed with patient.     Follow up with me in 3 months    [facility], TAYLER-CNS

## 2024-03-19 NOTE — ASSESSMENT & PLAN NOTE
Having severe drowsiness with ropinirole, can't drive while taking this medication. Not happy with this inhibition and feels like she is developing tolerance to medication at max dose    Interested in Tonic motor activation therapy  Will discuss with DME rep to see where to obtain equipment  Check iron studies to verify lack of deficiency  Correct hypokalemia with Kdur 10mEq daily

## 2024-03-20 ENCOUNTER — TELEPHONE (OUTPATIENT)
Dept: PRIMARY CARE | Facility: CLINIC | Age: 64
End: 2024-03-20
Payer: COMMERCIAL

## 2024-03-20 PROBLEM — E87.6 HYPOKALEMIA: Status: ACTIVE | Noted: 2024-03-20

## 2024-03-20 NOTE — ASSESSMENT & PLAN NOTE
Unable to wear knee high compression stockings due to body structure  Continues to have 1+ bilateral lower extremity edema     Will have her trial pantyhose style compression stockings which she thinks will be a better option

## 2024-04-04 ENCOUNTER — LAB (OUTPATIENT)
Dept: LAB | Facility: LAB | Age: 64
End: 2024-04-04
Payer: COMMERCIAL

## 2024-04-04 DIAGNOSIS — E87.6 HYPOKALEMIA: ICD-10-CM

## 2024-04-04 DIAGNOSIS — I10 PRIMARY HYPERTENSION: ICD-10-CM

## 2024-04-04 LAB
ALBUMIN SERPL BCP-MCNC: 4.4 G/DL (ref 3.4–5)
ANION GAP SERPL CALC-SCNC: 12 MMOL/L (ref 10–20)
BUN SERPL-MCNC: 27 MG/DL (ref 6–23)
CALCIUM SERPL-MCNC: 9.5 MG/DL (ref 8.6–10.3)
CHLORIDE SERPL-SCNC: 102 MMOL/L (ref 98–107)
CO2 SERPL-SCNC: 29 MMOL/L (ref 21–32)
CREAT SERPL-MCNC: 0.96 MG/DL (ref 0.5–1.05)
EGFRCR SERPLBLD CKD-EPI 2021: 67 ML/MIN/1.73M*2
GLUCOSE SERPL-MCNC: 103 MG/DL (ref 74–99)
PHOSPHATE SERPL-MCNC: 3.3 MG/DL (ref 2.5–4.9)
POTASSIUM SERPL-SCNC: 3.5 MMOL/L (ref 3.5–5.3)
SODIUM SERPL-SCNC: 139 MMOL/L (ref 136–145)

## 2024-04-04 PROCEDURE — 36415 COLL VENOUS BLD VENIPUNCTURE: CPT

## 2024-04-04 PROCEDURE — 80069 RENAL FUNCTION PANEL: CPT

## 2024-04-15 DIAGNOSIS — E87.6 HYPOKALEMIA: ICD-10-CM

## 2024-04-15 DIAGNOSIS — E78.5 DYSLIPIDEMIA: ICD-10-CM

## 2024-04-15 DIAGNOSIS — D50.9 IRON DEFICIENCY ANEMIA, UNSPECIFIED IRON DEFICIENCY ANEMIA TYPE: Primary | ICD-10-CM

## 2024-04-15 RX ORDER — POTASSIUM CHLORIDE 750 MG/1
20 TABLET, FILM COATED, EXTENDED RELEASE ORAL 2 TIMES DAILY
Qty: 120 TABLET | Refills: 5 | Status: SHIPPED | OUTPATIENT
Start: 2024-04-15 | End: 2025-04-15

## 2024-04-22 ENCOUNTER — TELEPHONE (OUTPATIENT)
Dept: PRIMARY CARE | Facility: CLINIC | Age: 64
End: 2024-04-22
Payer: COMMERCIAL

## 2024-04-25 NOTE — TELEPHONE ENCOUNTER
Please have her continue on 20mEq twice daily which should equal 2 tablets twice per day with current script
Returning call to [facility].  Pt is already on Potassium amount that you wanted to move her up to and was awaiting to find out what to do about her Potassium.  Pt is returning call from message left on Friday.     
Talked with pt and gave Mary Quintana's message.  No questions at this time.   
What Is The Reason For Today's Visit?: Full Body Skin Examination with No Concerns
What Is The Reason For Today's Visit? (Being Monitored For X): concerning skin lesions on an annual basis
How Severe Are Your Spot(S)?: mild

## 2024-09-19 ENCOUNTER — APPOINTMENT (OUTPATIENT)
Dept: PRIMARY CARE | Facility: CLINIC | Age: 64
End: 2024-09-19
Payer: COMMERCIAL

## 2024-09-19 VITALS
BODY MASS INDEX: 33.57 KG/M2 | DIASTOLIC BLOOD PRESSURE: 65 MMHG | TEMPERATURE: 97.3 F | OXYGEN SATURATION: 95 % | SYSTOLIC BLOOD PRESSURE: 98 MMHG | HEIGHT: 60 IN | WEIGHT: 171 LBS | RESPIRATION RATE: 20 BRPM | HEART RATE: 55 BPM

## 2024-09-19 DIAGNOSIS — I10 ESSENTIAL HYPERTENSION: Primary | ICD-10-CM

## 2024-09-19 DIAGNOSIS — E87.6 HYPOKALEMIA: ICD-10-CM

## 2024-09-19 PROCEDURE — 3074F SYST BP LT 130 MM HG: CPT

## 2024-09-19 PROCEDURE — 1036F TOBACCO NON-USER: CPT

## 2024-09-19 PROCEDURE — 99214 OFFICE O/P EST MOD 30 MIN: CPT

## 2024-09-19 PROCEDURE — 3008F BODY MASS INDEX DOCD: CPT

## 2024-09-19 PROCEDURE — 3078F DIAST BP <80 MM HG: CPT

## 2024-09-19 RX ORDER — LOSARTAN POTASSIUM 100 MG/1
100 TABLET ORAL DAILY
Qty: 100 TABLET | Refills: 3 | Status: SHIPPED | OUTPATIENT
Start: 2024-09-19 | End: 2025-10-24

## 2024-09-19 SDOH — ECONOMIC STABILITY: FOOD INSECURITY: WITHIN THE PAST 12 MONTHS, YOU WORRIED THAT YOUR FOOD WOULD RUN OUT BEFORE YOU GOT MONEY TO BUY MORE.: NEVER TRUE

## 2024-09-19 SDOH — ECONOMIC STABILITY: FOOD INSECURITY: WITHIN THE PAST 12 MONTHS, THE FOOD YOU BOUGHT JUST DIDN'T LAST AND YOU DIDN'T HAVE MONEY TO GET MORE.: NEVER TRUE

## 2024-09-19 ASSESSMENT — ENCOUNTER SYMPTOMS
CONSTITUTIONAL NEGATIVE: 1
PSYCHIATRIC NEGATIVE: 1
GASTROINTESTINAL NEGATIVE: 1
HEMATOLOGIC/LYMPHATIC NEGATIVE: 1
ENDOCRINE NEGATIVE: 1
NEUROLOGICAL NEGATIVE: 1
RESPIRATORY NEGATIVE: 1
MUSCULOSKELETAL NEGATIVE: 1
EYES NEGATIVE: 1
CARDIOVASCULAR NEGATIVE: 1

## 2024-09-19 ASSESSMENT — LIFESTYLE VARIABLES
HOW OFTEN DO YOU HAVE A DRINK CONTAINING ALCOHOL: NEVER
AUDIT-C TOTAL SCORE: 0
SKIP TO QUESTIONS 9-10: 1
HOW MANY STANDARD DRINKS CONTAINING ALCOHOL DO YOU HAVE ON A TYPICAL DAY: PATIENT DOES NOT DRINK
HOW OFTEN DO YOU HAVE SIX OR MORE DRINKS ON ONE OCCASION: NEVER

## 2024-09-19 ASSESSMENT — PATIENT HEALTH QUESTIONNAIRE - PHQ9
2. FEELING DOWN, DEPRESSED OR HOPELESS: NOT AT ALL
1. LITTLE INTEREST OR PLEASURE IN DOING THINGS: NOT AT ALL
SUM OF ALL RESPONSES TO PHQ9 QUESTIONS 1 & 2: 0

## 2024-09-19 ASSESSMENT — PAIN SCALES - GENERAL: PAINLEVEL: 0-NO PAIN

## 2024-09-19 NOTE — ASSESSMENT & PLAN NOTE
K improved to 3.5 with addition of Kdur 20mEq daily  Advised to increase to twice daily however often forgetting evening dose  Likely hypokalemic in setting of hydrochlorothiazide use    Stop hydrochlorothiazide  Stop potassium chloride  Repeat RFP in 7-10 days to check potassium levels

## 2024-09-19 NOTE — PATIENT INSTRUCTIONS
Thank you for coming to see me today.  If you have any questions or concerns following our visit, please contact the office.  Phone: [phone]    Follow up with me in December or sooner as needed    1)  Get fasting labwork in the next 1-2 weeks.  The lab is down the [place] from our office.     2) STOP losartan-hydrochlorothiazide. START losartan 100mg daily    3) STOP potassium chloride

## 2024-09-19 NOTE — ASSESSMENT & PLAN NOTE
Mildly hypotensive in office at 98/65  Home blood pressures running 100/75    Stop hydrochlorothiazide; continue losartan 100mg daily  Repeat RFP in 7-10 days to check potassium

## 2024-09-30 DIAGNOSIS — E55.9 VITAMIN D DEFICIENCY: ICD-10-CM

## 2024-09-30 DIAGNOSIS — G25.81 RESTLESS LEGS SYNDROME: ICD-10-CM

## 2024-10-02 RX ORDER — ROPINIROLE 2 MG/1
6 TABLET, FILM COATED ORAL NIGHTLY
Qty: 270 TABLET | Refills: 3 | OUTPATIENT
Start: 2024-10-02

## 2024-10-02 RX ORDER — ACETAMINOPHEN 500 MG
5000 TABLET ORAL EVERY OTHER DAY
Qty: 45 TABLET | Refills: 3 | Status: SHIPPED | OUTPATIENT
Start: 2024-10-02

## 2024-10-07 DIAGNOSIS — G25.81 RESTLESS LEGS SYNDROME: ICD-10-CM

## 2024-10-08 RX ORDER — ROPINIROLE 2 MG/1
6 TABLET, FILM COATED ORAL NIGHTLY
Qty: 270 TABLET | Refills: 3 | Status: SHIPPED | OUTPATIENT
Start: 2024-10-08

## 2024-10-08 NOTE — TELEPHONE ENCOUNTER
Pt called and said that she only has 1 pill of her Ropinirole.  She has been requesting a refill since the beginning of October.  The pharmacy says that they don't have any prescriptions for the medication.  It was previously prescribed but was denied because it may have been a duplicate prescription.  Pharmacy will not fill since it was denied earlier but will fill with a new prescription.  Pt would like it refilled because she knows she will need more than one pill to carry her through the evening.

## 2024-10-29 ENCOUNTER — LAB (OUTPATIENT)
Dept: LAB | Facility: LAB | Age: 64
End: 2024-10-29
Payer: COMMERCIAL

## 2024-10-29 DIAGNOSIS — D50.9 IRON DEFICIENCY ANEMIA, UNSPECIFIED IRON DEFICIENCY ANEMIA TYPE: ICD-10-CM

## 2024-10-29 DIAGNOSIS — E87.6 HYPOKALEMIA: ICD-10-CM

## 2024-10-29 DIAGNOSIS — E78.5 DYSLIPIDEMIA: ICD-10-CM

## 2024-10-29 LAB
ALBUMIN SERPL BCP-MCNC: 4 G/DL (ref 3.4–5)
ANION GAP SERPL CALC-SCNC: 9 MMOL/L (ref 10–20)
BUN SERPL-MCNC: 14 MG/DL (ref 6–23)
CALCIUM SERPL-MCNC: 8.9 MG/DL (ref 8.6–10.3)
CHLORIDE SERPL-SCNC: 109 MMOL/L (ref 98–107)
CHOLEST SERPL-MCNC: 159 MG/DL (ref 0–199)
CHOLESTEROL/HDL RATIO: 2.8
CO2 SERPL-SCNC: 25 MMOL/L (ref 21–32)
CREAT SERPL-MCNC: 0.84 MG/DL (ref 0.5–1.05)
EGFRCR SERPLBLD CKD-EPI 2021: 79 ML/MIN/1.73M*2
FERRITIN SERPL-MCNC: 72 NG/ML (ref 8–150)
GLUCOSE SERPL-MCNC: 95 MG/DL (ref 74–99)
HDLC SERPL-MCNC: 56.5 MG/DL
IRON SATN MFR SERPL: 20 % (ref 25–45)
IRON SERPL-MCNC: 71 UG/DL (ref 35–150)
LDLC SERPL CALC-MCNC: 86 MG/DL
NON HDL CHOLESTEROL: 103 MG/DL (ref 0–149)
PHOSPHATE SERPL-MCNC: 3 MG/DL (ref 2.5–4.9)
POTASSIUM SERPL-SCNC: 3.3 MMOL/L (ref 3.5–5.3)
SODIUM SERPL-SCNC: 140 MMOL/L (ref 136–145)
TIBC SERPL-MCNC: 363 UG/DL (ref 240–445)
TRIGL SERPL-MCNC: 83 MG/DL (ref 0–149)
UIBC SERPL-MCNC: 292 UG/DL (ref 110–370)
VLDL: 17 MG/DL (ref 0–40)

## 2024-10-29 PROCEDURE — 36415 COLL VENOUS BLD VENIPUNCTURE: CPT

## 2024-10-29 PROCEDURE — 80069 RENAL FUNCTION PANEL: CPT

## 2024-10-29 PROCEDURE — 83540 ASSAY OF IRON: CPT

## 2024-10-29 PROCEDURE — 80061 LIPID PANEL: CPT

## 2024-10-29 PROCEDURE — 82728 ASSAY OF FERRITIN: CPT

## 2024-10-29 PROCEDURE — 83550 IRON BINDING TEST: CPT

## 2024-10-30 DIAGNOSIS — E87.6 HYPOKALEMIA: Primary | ICD-10-CM

## 2024-10-30 RX ORDER — POTASSIUM CHLORIDE 750 MG/1
10 TABLET, FILM COATED, EXTENDED RELEASE ORAL DAILY
Qty: 90 TABLET | Refills: 3 | Status: SHIPPED | OUTPATIENT
Start: 2024-10-30 | End: 2025-10-30

## 2024-12-09 ENCOUNTER — APPOINTMENT (OUTPATIENT)
Dept: PRIMARY CARE | Facility: CLINIC | Age: 64
End: 2024-12-09
Payer: COMMERCIAL

## 2024-12-23 ENCOUNTER — APPOINTMENT (OUTPATIENT)
Dept: PRIMARY CARE | Facility: CLINIC | Age: 64
End: 2024-12-23
Payer: COMMERCIAL

## 2024-12-23 VITALS
DIASTOLIC BLOOD PRESSURE: 77 MMHG | HEIGHT: 60 IN | TEMPERATURE: 97.5 F | HEART RATE: 80 BPM | WEIGHT: 184 LBS | RESPIRATION RATE: 16 BRPM | SYSTOLIC BLOOD PRESSURE: 147 MMHG | BODY MASS INDEX: 36.12 KG/M2 | OXYGEN SATURATION: 96 %

## 2024-12-23 DIAGNOSIS — E55.9 VITAMIN D DEFICIENCY: ICD-10-CM

## 2024-12-23 DIAGNOSIS — Z13.29 SCREENING FOR THYROID DISORDER: ICD-10-CM

## 2024-12-23 DIAGNOSIS — Z12.31 BREAST CANCER SCREENING BY MAMMOGRAM: ICD-10-CM

## 2024-12-23 DIAGNOSIS — I10 ESSENTIAL HYPERTENSION: Primary | ICD-10-CM

## 2024-12-23 DIAGNOSIS — Z13.220 SCREENING FOR HYPERLIPIDEMIA: ICD-10-CM

## 2024-12-23 DIAGNOSIS — E87.6 HYPOKALEMIA: ICD-10-CM

## 2024-12-23 PROCEDURE — 99214 OFFICE O/P EST MOD 30 MIN: CPT

## 2024-12-23 PROCEDURE — 3008F BODY MASS INDEX DOCD: CPT

## 2024-12-23 PROCEDURE — 3077F SYST BP >= 140 MM HG: CPT

## 2024-12-23 PROCEDURE — 3078F DIAST BP <80 MM HG: CPT

## 2024-12-23 RX ORDER — HYDROCHLOROTHIAZIDE 12.5 MG/1
12.5 TABLET ORAL DAILY
Qty: 90 TABLET | Refills: 3 | Status: SHIPPED | OUTPATIENT
Start: 2024-12-23 | End: 2024-12-23 | Stop reason: ALTCHOICE

## 2024-12-23 RX ORDER — HYDROCHLOROTHIAZIDE 12.5 MG/1
12.5 TABLET ORAL DAILY
Qty: 30 TABLET | Refills: 0 | Status: SHIPPED | OUTPATIENT
Start: 2024-12-23 | End: 2024-12-23

## 2024-12-23 RX ORDER — SPIRONOLACTONE 25 MG/1
12.5 TABLET ORAL DAILY
Qty: 45 TABLET | Refills: 1 | Status: SHIPPED | OUTPATIENT
Start: 2024-12-23 | End: 2025-06-21

## 2024-12-23 SDOH — ECONOMIC STABILITY: FOOD INSECURITY: WITHIN THE PAST 12 MONTHS, YOU WORRIED THAT YOUR FOOD WOULD RUN OUT BEFORE YOU GOT MONEY TO BUY MORE.: NEVER TRUE

## 2024-12-23 SDOH — ECONOMIC STABILITY: FOOD INSECURITY: WITHIN THE PAST 12 MONTHS, THE FOOD YOU BOUGHT JUST DIDN'T LAST AND YOU DIDN'T HAVE MONEY TO GET MORE.: NEVER TRUE

## 2024-12-23 ASSESSMENT — ENCOUNTER SYMPTOMS
HEMATOLOGIC/LYMPHATIC NEGATIVE: 1
EYES NEGATIVE: 1
NEUROLOGICAL NEGATIVE: 1
CONSTITUTIONAL NEGATIVE: 1
PSYCHIATRIC NEGATIVE: 1
DEPRESSION: 0
ENDOCRINE NEGATIVE: 1
GASTROINTESTINAL NEGATIVE: 1
OCCASIONAL FEELINGS OF UNSTEADINESS: 0
LOSS OF SENSATION IN FEET: 0
RESPIRATORY NEGATIVE: 1
CARDIOVASCULAR NEGATIVE: 1
MUSCULOSKELETAL NEGATIVE: 1

## 2024-12-23 ASSESSMENT — PATIENT HEALTH QUESTIONNAIRE - PHQ9
2. FEELING DOWN, DEPRESSED OR HOPELESS: NOT AT ALL
1. LITTLE INTEREST OR PLEASURE IN DOING THINGS: NOT AT ALL
SUM OF ALL RESPONSES TO PHQ9 QUESTIONS 1 & 2: 0
1. LITTLE INTEREST OR PLEASURE IN DOING THINGS: NOT AT ALL
2. FEELING DOWN, DEPRESSED OR HOPELESS: NOT AT ALL
SUM OF ALL RESPONSES TO PHQ9 QUESTIONS 1 AND 2: 0

## 2024-12-23 ASSESSMENT — ANXIETY QUESTIONNAIRES
5. BEING SO RESTLESS THAT IT IS HARD TO SIT STILL: NOT AT ALL
6. BECOMING EASILY ANNOYED OR IRRITABLE: NOT AT ALL
IF YOU CHECKED OFF ANY PROBLEMS ON THIS QUESTIONNAIRE, HOW DIFFICULT HAVE THESE PROBLEMS MADE IT FOR YOU TO DO YOUR WORK, TAKE CARE OF THINGS AT HOME, OR GET ALONG WITH OTHER PEOPLE: NOT DIFFICULT AT ALL
7. FEELING AFRAID AS IF SOMETHING AWFUL MIGHT HAPPEN: NOT AT ALL
2. NOT BEING ABLE TO STOP OR CONTROL WORRYING: NOT AT ALL
4. TROUBLE RELAXING: NOT AT ALL
1. FEELING NERVOUS, ANXIOUS, OR ON EDGE: NOT AT ALL
GAD7 TOTAL SCORE: 0
3. WORRYING TOO MUCH ABOUT DIFFERENT THINGS: NOT AT ALL

## 2024-12-23 ASSESSMENT — LIFESTYLE VARIABLES
SKIP TO QUESTIONS 9-10: 1
HOW OFTEN DO YOU HAVE SIX OR MORE DRINKS ON ONE OCCASION: NEVER
HOW MANY STANDARD DRINKS CONTAINING ALCOHOL DO YOU HAVE ON A TYPICAL DAY: PATIENT DOES NOT DRINK
AUDIT-C TOTAL SCORE: 0
HOW OFTEN DO YOU HAVE A DRINK CONTAINING ALCOHOL: NEVER

## 2024-12-23 ASSESSMENT — COLUMBIA-SUICIDE SEVERITY RATING SCALE - C-SSRS
6. HAVE YOU EVER DONE ANYTHING, STARTED TO DO ANYTHING, OR PREPARED TO DO ANYTHING TO END YOUR LIFE?: NO
1. IN THE PAST MONTH, HAVE YOU WISHED YOU WERE DEAD OR WISHED YOU COULD GO TO SLEEP AND NOT WAKE UP?: NO
2. HAVE YOU ACTUALLY HAD ANY THOUGHTS OF KILLING YOURSELF?: NO

## 2024-12-23 ASSESSMENT — PAIN SCALES - GENERAL: PAINLEVEL_OUTOF10: 0-NO PAIN

## 2024-12-23 NOTE — ASSESSMENT & PLAN NOTE
Mildly hypertensive in office at 147/77  Home blood pressure 120-140s  Hydrochlorothiazide 25mg daily stopped at last visit- hypokalemia with this    Start spironolactone 12.5mg daily  Continue losartan 100mg daily  Advised checking blood pressures at home, record the values and bring to next office visit

## 2024-12-23 NOTE — ASSESSMENT & PLAN NOTE
K low at 3.3 on labs from 10/2024  Maintained on Kdur 10 mEq daily  Hydrochlorothiazide stopped in 10/2024.    Repeat RFP next year  Starting spironolactone 12.5mg daily  Hold Kdur 10mEq in setting of starting spironolactone

## 2024-12-23 NOTE — PROGRESS NOTES
"Subjective   Patient ID: Radha Mcgee is a 64 y.o. female who presents for 3 month follow up of hypertension.    Hydrochlorothiazide 25mg daily stopped at last visit. Home blood pressures running 140s.   Reports coming to office after big lunch at local restaurant.     Diagnosed with \"macular pucker\" in ophthalmology. Was seen by [facility] in [place], was told her vision was 20/40. Had vitrectomy to correct macular pucker on 11/21/24. Was told to let me know that she was given twilight sleep    Diet: Eating fish, chicken, veggies, fruit. Drinking 2 glasses soda at night. Ice cream once every 2 weeks, candy occasionally.   Exercise: No regular exercise, signed up for exercise program but cancelled it due to  losing her job. Planning to start walking  Weight: Stable, lost 15lbs over the last 9 months  Water: Drinking 40oz per day  Sleep: Good uninterrupted sleep about 8 hours per night  Social: , lives in 2 floor home, most everything on the first floor.   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    HENT: Negative.     Eyes: Negative.    Respiratory: Negative.     Cardiovascular: Negative.    Gastrointestinal: Negative.    Endocrine: Negative.    Genitourinary: Negative.    Musculoskeletal: Negative.    Skin: Negative.    Neurological: Negative.    Hematological: Negative.    Psychiatric/Behavioral: Negative.          Current Outpatient Medications   Medication Sig Dispense Refill    acyclovir (Zovirax) 5 % ointment Apply 1 Application topically 6 times a day. Space applications every 3 hours. 15 g 2    albuterol 90 mcg/actuation inhaler Inhale 2 puffs every 4 hours if needed for wheezing or shortness of breath. 18 g 1    benzonatate (Tessalon) 100 mg capsule Take 1 capsule (100 mg) by mouth 3 times a day as needed for cough.      cholecalciferol (Vitamin D-3) 5,000 Units tablet Take 1 tablet (5,000 Units) by mouth every other day. 45 " tablet 3    compression panty, 1x-2x misc 1 Units once daily. 2 each 2    losartan (Cozaar) 100 mg tablet Take 1 tablet (100 mg) by mouth once daily. 100 tablet 3    nystatin (Mycostatin) 100,000 unit/gram powder Apply 1 Application topically 4 times a day. 60 g 3    rOPINIRole (Requip) 2 mg tablet TAKE 3 TABLETS BY MOUTH ONCE DAILY AT BEDTIME 270 tablet 3    valACYclovir (Valtrex) 1 gram tablet Take 1 tablet (1,000 mg) by mouth once daily as needed (HSV1 outbreak.). Take for 5 days for HSV outbreak 5 tablet 3    spironolactone (Aldactone) 25 mg tablet Take 0.5 tablets (12.5 mg) by mouth once daily. 45 tablet 1     No current facility-administered medications for this visit.     Past Surgical History:   Procedure Laterality Date    CHOLECYSTECTOMY  03/28/2013    Cholecystectomy Laparoscopic    COLONOSCOPY  05/23/2013    Complete Colonoscopy    REFRACTIVE SURGERY Right 11/21/2024    Vitrectomy Surgery -[facility]    WISDOM TOOTH EXTRACTION       Family History   Problem Relation Name Age of Onset    Hypertension Mother      Arthritis Mother      Anemia Mother      Cirrhosis Mother          primary biliary    Hypertension Father      Arthritis Father      Stroke Father      Hypertension Sister      Stroke Sister      Mental illness Daughter      Arthritis Maternal Grandmother      Arthritis Maternal Grandfather      Arthritis Paternal Grandmother      Arthritis Paternal Grandfather        Social History     Tobacco Use    Smoking status: Never     Passive exposure: Never    Smokeless tobacco: Never   Vaping Use    Vaping status: Never Used   Substance Use Topics    Alcohol use: Never    Drug use: Never        Objective     Visit Vitals  /77 (BP Location: Right arm, Patient Position: Sitting, BP Cuff Size: Adult long)   Pulse 80   Temp 36.4 °C (97.5 °F) (Temporal)   Resp 16   Ht 1.524 m (5')   Wt 83.5 kg (184 lb)   SpO2 96%   BMI 35.94 kg/m²   Smoking Status Never   BSA 1.88  m²        Physical Exam  Constitutional:       Appearance: Normal appearance.   HENT:      Head: Normocephalic and atraumatic.   Eyes:      Extraocular Movements: Extraocular movements intact.      Pupils: Pupils are equal, round, and reactive to light.   Pulmonary:      Effort: Pulmonary effort is normal.   Musculoskeletal:         General: Normal range of motion.   Skin:     General: Skin is warm and dry.      Capillary Refill: Capillary refill takes less than 2 seconds.   Neurological:      General: No focal deficit present.      Mental Status: She is alert and oriented to person, place, and time.   Psychiatric:         Mood and Affect: Mood normal.         Behavior: Behavior normal.         Assessment/Plan   Problem List Items Addressed This Visit       Vitamin D deficiency    Relevant Orders    Vitamin D 25-Hydroxy,Total (for eval of Vitamin D levels)    Essential hypertension - Primary     Mildly hypertensive in office at 147/77  Home blood pressure 120-140s  Hydrochlorothiazide 25mg daily stopped at last visit- hypokalemia with this    Start spironolactone 12.5mg daily  Continue losartan 100mg daily  Advised checking blood pressures at home, record the values and bring to next office visit           Relevant Medications    spironolactone (Aldactone) 25 mg tablet    Other Relevant Orders    CBC    Comprehensive Metabolic Panel    Hypokalemia     K low at 3.3 on labs from 10/2024  Maintained on Kdur 10 mEq daily  Hydrochlorothiazide stopped in 10/2024.    Repeat RFP next year  Starting spironolactone 12.5mg daily  Hold Kdur 10mEq in setting of starting spironolactone         Relevant Medications    spironolactone (Aldactone) 25 mg tablet     Other Visit Diagnoses       Breast cancer screening by mammogram        Relevant Orders    BI mammo bilateral screening tomosynthesis    Screening for hyperlipidemia        Relevant Orders    Lipid Panel    Screening for thyroid disorder        Relevant Orders    TSH with  reflex to Free T4 if abnormal            All pertinent lab work and results were reviewed with patient.     Follow up with me in 3 months for blood pressure    TAYLER Lima-CNS

## 2024-12-23 NOTE — PATIENT INSTRUCTIONS
Thank you for coming to see me today.  If you have any questions or concerns following our visit, please contact the office.  Phone: [phone]    Follow up with me in 3 months or sooner as needed    1)  START spironolactone 12.5mg daily in addition to losartan 100mg daily for blood pressure    2) Get blood drawn next week to check potassium level    3) Please schedule a mammogram on or after 3/16/2025 - please call [phone] or schedule at  on your way out today    4) Get fasting labwork a few days prior to next visit.  The lab is down the [place] from our office.

## 2025-03-17 ENCOUNTER — HOSPITAL ENCOUNTER (OUTPATIENT)
Dept: RADIOLOGY | Facility: CLINIC | Age: 65
Discharge: HOME | End: 2025-03-17
Payer: COMMERCIAL

## 2025-03-17 ENCOUNTER — APPOINTMENT (OUTPATIENT)
Dept: PRIMARY CARE | Facility: CLINIC | Age: 65
End: 2025-03-17
Payer: COMMERCIAL

## 2025-03-17 VITALS — WEIGHT: 184 LBS | BODY MASS INDEX: 36.12 KG/M2 | HEIGHT: 60 IN

## 2025-03-17 DIAGNOSIS — Z12.31 BREAST CANCER SCREENING BY MAMMOGRAM: ICD-10-CM

## 2025-03-17 PROCEDURE — 77067 SCR MAMMO BI INCL CAD: CPT

## 2025-03-17 PROCEDURE — 77063 BREAST TOMOSYNTHESIS BI: CPT

## 2025-03-18 ENCOUNTER — APPOINTMENT (OUTPATIENT)
Dept: PRIMARY CARE | Facility: CLINIC | Age: 65
End: 2025-03-18
Payer: COMMERCIAL

## 2025-03-18 VITALS
DIASTOLIC BLOOD PRESSURE: 76 MMHG | WEIGHT: 178 LBS | OXYGEN SATURATION: 99 % | SYSTOLIC BLOOD PRESSURE: 122 MMHG | BODY MASS INDEX: 33.61 KG/M2 | HEIGHT: 61 IN | TEMPERATURE: 97.1 F | RESPIRATION RATE: 18 BRPM | HEART RATE: 66 BPM

## 2025-03-18 DIAGNOSIS — L25.9 CONTACT DERMATITIS AND ECZEMA: ICD-10-CM

## 2025-03-18 DIAGNOSIS — L30.9 DERMATITIS: ICD-10-CM

## 2025-03-18 DIAGNOSIS — E87.6 HYPOKALEMIA: ICD-10-CM

## 2025-03-18 DIAGNOSIS — I10 ESSENTIAL HYPERTENSION: Primary | ICD-10-CM

## 2025-03-18 DIAGNOSIS — B00.9 HSV-1 INFECTION: ICD-10-CM

## 2025-03-18 DIAGNOSIS — Z00.00 HEALTHCARE MAINTENANCE: ICD-10-CM

## 2025-03-18 LAB
25(OH)D3+25(OH)D2 SERPL-MCNC: 50 NG/ML (ref 30–100)
ALBUMIN SERPL-MCNC: 4.5 G/DL (ref 3.6–5.1)
ALP SERPL-CCNC: 71 U/L (ref 37–153)
ALT SERPL-CCNC: 16 U/L (ref 6–29)
ANION GAP SERPL CALCULATED.4IONS-SCNC: 7 MMOL/L (CALC) (ref 7–17)
AST SERPL-CCNC: 12 U/L (ref 10–35)
BILIRUB SERPL-MCNC: 0.5 MG/DL (ref 0.2–1.2)
BUN SERPL-MCNC: 13 MG/DL (ref 7–25)
CALCIUM SERPL-MCNC: 9.6 MG/DL (ref 8.6–10.4)
CHLORIDE SERPL-SCNC: 107 MMOL/L (ref 98–110)
CHOLEST SERPL-MCNC: 170 MG/DL
CHOLEST/HDLC SERPL: 2.8 (CALC)
CO2 SERPL-SCNC: 27 MMOL/L (ref 20–32)
CREAT SERPL-MCNC: 0.9 MG/DL (ref 0.5–1.05)
EGFRCR SERPLBLD CKD-EPI 2021: 72 ML/MIN/1.73M2
ERYTHROCYTE [DISTWIDTH] IN BLOOD BY AUTOMATED COUNT: 14.3 % (ref 11–15)
GLUCOSE SERPL-MCNC: 88 MG/DL (ref 65–99)
HCT VFR BLD AUTO: 46.9 % (ref 35–45)
HDLC SERPL-MCNC: 60 MG/DL
HGB BLD-MCNC: 15.2 G/DL (ref 11.7–15.5)
LDLC SERPL CALC-MCNC: 93 MG/DL (CALC)
MCH RBC QN AUTO: 27.7 PG (ref 27–33)
MCHC RBC AUTO-ENTMCNC: 32.4 G/DL (ref 32–36)
MCV RBC AUTO: 85.4 FL (ref 80–100)
NONHDLC SERPL-MCNC: 110 MG/DL (CALC)
PLATELET # BLD AUTO: 297 THOUSAND/UL (ref 140–400)
PMV BLD REES-ECKER: 9.9 FL (ref 7.5–12.5)
POTASSIUM SERPL-SCNC: 4.1 MMOL/L (ref 3.5–5.3)
PROT SERPL-MCNC: 6.8 G/DL (ref 6.1–8.1)
RBC # BLD AUTO: 5.49 MILLION/UL (ref 3.8–5.1)
SODIUM SERPL-SCNC: 141 MMOL/L (ref 135–146)
TRIGL SERPL-MCNC: 79 MG/DL
TSH SERPL-ACNC: 4.43 MIU/L (ref 0.4–4.5)
WBC # BLD AUTO: 9.2 THOUSAND/UL (ref 3.8–10.8)

## 2025-03-18 PROCEDURE — 3074F SYST BP LT 130 MM HG: CPT

## 2025-03-18 PROCEDURE — 99212 OFFICE O/P EST SF 10 MIN: CPT

## 2025-03-18 PROCEDURE — 3078F DIAST BP <80 MM HG: CPT

## 2025-03-18 PROCEDURE — 3008F BODY MASS INDEX DOCD: CPT

## 2025-03-18 PROCEDURE — 1036F TOBACCO NON-USER: CPT

## 2025-03-18 PROCEDURE — 99396 PREV VISIT EST AGE 40-64: CPT

## 2025-03-18 RX ORDER — HALOBETASOL PROPIONATE 0.5 MG/G
CREAM TOPICAL 2 TIMES DAILY
Qty: 15 G | Refills: 1 | Status: SHIPPED | OUTPATIENT
Start: 2025-03-18

## 2025-03-18 RX ORDER — ACYCLOVIR 50 MG/G
1 OINTMENT TOPICAL
Qty: 15 G | Refills: 2 | Status: SHIPPED | OUTPATIENT
Start: 2025-03-18

## 2025-03-18 RX ORDER — VALACYCLOVIR HYDROCHLORIDE 1 G/1
1000 TABLET, FILM COATED ORAL DAILY PRN
Qty: 5 TABLET | Refills: 5 | Status: SHIPPED | OUTPATIENT
Start: 2025-03-18

## 2025-03-18 RX ORDER — SPIRONOLACTONE 25 MG/1
12.5 TABLET ORAL DAILY
Qty: 45 TABLET | Refills: 3 | Status: SHIPPED | OUTPATIENT
Start: 2025-03-18

## 2025-03-18 RX ORDER — NYSTATIN 100000 [USP'U]/G
1 POWDER TOPICAL 4 TIMES DAILY
Qty: 60 G | Refills: 3 | Status: SHIPPED | OUTPATIENT
Start: 2025-03-18

## 2025-03-18 SDOH — ECONOMIC STABILITY: FOOD INSECURITY: WITHIN THE PAST 12 MONTHS, THE FOOD YOU BOUGHT JUST DIDN'T LAST AND YOU DIDN'T HAVE MONEY TO GET MORE.: NEVER TRUE

## 2025-03-18 SDOH — ECONOMIC STABILITY: FOOD INSECURITY: WITHIN THE PAST 12 MONTHS, YOU WORRIED THAT YOUR FOOD WOULD RUN OUT BEFORE YOU GOT MONEY TO BUY MORE.: NEVER TRUE

## 2025-03-18 ASSESSMENT — PAIN SCALES - GENERAL: PAINLEVEL_OUTOF10: 0-NO PAIN

## 2025-03-18 ASSESSMENT — ENCOUNTER SYMPTOMS
EYES NEGATIVE: 1
HEMATOLOGIC/LYMPHATIC NEGATIVE: 1
ENDOCRINE NEGATIVE: 1
GASTROINTESTINAL NEGATIVE: 1
PSYCHIATRIC NEGATIVE: 1
CARDIOVASCULAR NEGATIVE: 1
NEUROLOGICAL NEGATIVE: 1
RESPIRATORY NEGATIVE: 1
MUSCULOSKELETAL NEGATIVE: 1
CONSTITUTIONAL NEGATIVE: 1

## 2025-03-18 ASSESSMENT — LIFESTYLE VARIABLES
AUDIT-C TOTAL SCORE: 0
HOW OFTEN DO YOU HAVE SIX OR MORE DRINKS ON ONE OCCASION: NEVER
SKIP TO QUESTIONS 9-10: 1
HOW MANY STANDARD DRINKS CONTAINING ALCOHOL DO YOU HAVE ON A TYPICAL DAY: PATIENT DOES NOT DRINK
HOW OFTEN DO YOU HAVE A DRINK CONTAINING ALCOHOL: NEVER

## 2025-03-18 ASSESSMENT — PATIENT HEALTH QUESTIONNAIRE - PHQ9
1. LITTLE INTEREST OR PLEASURE IN DOING THINGS: NOT AT ALL
SUM OF ALL RESPONSES TO PHQ9 QUESTIONS 1 & 2: 0
2. FEELING DOWN, DEPRESSED OR HOPELESS: NOT AT ALL

## 2025-03-18 NOTE — ASSESSMENT & PLAN NOTE
Normotensive in office at 122/76  Home blood pressures running 129-135/75-85  K+ stable at 4.1 on labs from yesterday    Continue losartan 100mg daily and spironolactone 12.5mg daily

## 2025-03-18 NOTE — ASSESSMENT & PLAN NOTE
-Healthy diet, good quality and duration of sleep, healthy water intake, regular exercise and healthy weight discussed  -Immunizations:   Flu: Recommended annually  Shingrix: 1/2 shingrix completed   Tdap: Last in 3/2022  -Following with dentistry and optometry regularly  -Colon cancer screening: Colonoscopy in 4/2023, repeat in 4/2028  -Mammogram: Completed in 3/2025  -DEXA:  Last in 3/2023, repeat study recommended 3/2026  -GYN: last seen by Dr. Kumari in 4/2023  -Some concerns for anxiety/depression  -Tobacco never, EtOH none, No illicit/recreational drugs  -Feeling  safe at home

## 2025-03-18 NOTE — PATIENT INSTRUCTIONS
Thank you for coming to see me today.  If you have any questions or concerns following our visit, please contact the office.  Phone: [phone]    Follow up with me in 6 months or sooner as needed    1)  START halobetasol cream to thumb with peeling skin twice daily until resolved    2) Get second shingrix vaccine here or at local pharmacy

## 2025-03-18 NOTE — PROGRESS NOTES
Subjective   Patient ID: Radha Mcgee is a 64 y.o. female who presents for 3 month follow up of hypertension.    K stable at 4.1 on spironolactone.     Diet: Eating fish, chicken, veggies, fruit. Drinking 2 glasses soda at night. Ice cream once every 2 weeks, candy occasionally.   Exercise: No regular exercise, signed up for exercise program but cancelled it due to  losing her job. Planning to start walking  Weight: Stable, lost 15lbs over the last 9 months  Water: Drinking 40oz per day  Sleep: Good uninterrupted sleep about 8 hours per night  Social: , lives in 2 floor home, most everything on the first floor. Talking with daughter she was previously estranged with.   Professional: Works full time at [employer]; working a lot of overtime    Review of Systems   Constitutional: Negative.    HENT: Negative.     Eyes: Negative.    Respiratory: Negative.     Cardiovascular: Negative.    Gastrointestinal: Negative.    Endocrine: Negative.    Genitourinary: Negative.    Musculoskeletal: Negative.    Skin: Negative.    Neurological: Negative.    Hematological: Negative.    Psychiatric/Behavioral: Negative.          Current Outpatient Medications   Medication Sig Dispense Refill    albuterol 90 mcg/actuation inhaler Inhale 2 puffs every 4 hours if needed for wheezing or shortness of breath. 18 g 1    benzonatate (Tessalon) 100 mg capsule Take 1 capsule (100 mg) by mouth 3 times a day as needed for cough.      cholecalciferol (Vitamin D-3) 5,000 Units tablet Take 1 tablet (5,000 Units) by mouth every other day. 45 tablet 3    compression panty, 1x-2x misc 1 Units once daily. 2 each 2    losartan (Cozaar) 100 mg tablet Take 1 tablet (100 mg) by mouth once daily. 100 tablet 3    rOPINIRole (Requip) 2 mg tablet TAKE 3 TABLETS BY MOUTH ONCE DAILY AT BEDTIME 270 tablet 3    acyclovir (Zovirax) 5 % ointment Apply 1 Application topically 6 times a day. Space applications every 3 hours. 15 g 2    halobetasol  "(UltraVATE) 0.05 % cream Apply topically 2 times a day. 15 g 1    nystatin (Mycostatin) 100,000 unit/gram powder Apply 1 Application topically 4 times a day. 60 g 3    spironolactone (Aldactone) 25 mg tablet Take 0.5 tablets (12.5 mg) by mouth once daily. 45 tablet 3    valACYclovir (Valtrex) 1 gram tablet Take 1 tablet (1,000 mg) by mouth once daily as needed (HSV1 outbreak.). Take for 5 days for HSV outbreak 5 tablet 5     No current facility-administered medications for this visit.     Past Surgical History:   Procedure Laterality Date    CHOLECYSTECTOMY  03/28/2013    Cholecystectomy Laparoscopic    COLONOSCOPY  05/23/2013    Complete Colonoscopy    REFRACTIVE SURGERY Right 11/21/2024    Vitrectomy Surgery -[facility]    WISDOM TOOTH EXTRACTION       Family History   Problem Relation Name Age of Onset    Hypertension Mother      Arthritis Mother      Anemia Mother      Cirrhosis Mother          primary biliary    Hypertension Father      Arthritis Father      Stroke Father      Hypertension Sister      Stroke Sister      Mental illness Daughter      Arthritis Maternal Grandmother      Arthritis Maternal Grandfather      Arthritis Paternal Grandmother      Colon cancer Paternal Grandmother      Arthritis Paternal Grandfather        Social History     Tobacco Use    Smoking status: Never     Passive exposure: Never    Smokeless tobacco: Never   Vaping Use    Vaping status: Never Used   Substance Use Topics    Alcohol use: Never    Drug use: Never        Objective     Visit Vitals  /76 (BP Location: Left arm, Patient Position: Sitting, BP Cuff Size: Large adult)   Pulse 66   Temp 36.2 °C (97.1 °F)   Resp 18   Ht 1.537 m (5' 0.5\")   Wt 80.7 kg (178 lb)   LMP 04/01/2018   SpO2 99%   BMI 34.19 kg/m²   OB Status Postmenopausal   Smoking Status Never   BSA 1.86 m²        Physical Exam  Constitutional:       Appearance: Normal appearance. She is obese.   HENT:      Head: " Normocephalic and atraumatic.   Eyes:      Extraocular Movements: Extraocular movements intact.      Pupils: Pupils are equal, round, and reactive to light.   Cardiovascular:      Rate and Rhythm: Normal rate and regular rhythm.   Pulmonary:      Effort: Pulmonary effort is normal.      Breath sounds: Normal breath sounds.   Abdominal:      General: Abdomen is flat. Bowel sounds are normal.      Palpations: Abdomen is soft.   Musculoskeletal:         General: Normal range of motion.      Right lower le+ Edema present.      Left lower le+ Edema present.   Skin:     General: Skin is warm and dry.      Capillary Refill: Capillary refill takes less than 2 seconds.   Neurological:      General: No focal deficit present.      Mental Status: She is alert and oriented to person, place, and time.   Psychiatric:         Mood and Affect: Mood normal.         Behavior: Behavior normal.           Assessment/Plan   Problem List Items Addressed This Visit       Contact dermatitis and eczema    Relevant Medications    nystatin (Mycostatin) 100,000 unit/gram powder    Essential hypertension - Primary     Normotensive in office at 122/76  Home blood pressures running 129-135/75-85  K+ stable at 4.1 on labs from yesterday    Continue losartan 100mg daily and spironolactone 12.5mg daily         Relevant Medications    spironolactone (Aldactone) 25 mg tablet    Hypokalemia    Relevant Medications    spironolactone (Aldactone) 25 mg tablet    Healthcare maintenance     -Healthy diet, good quality and duration of sleep, healthy water intake, regular exercise and healthy weight discussed  -Immunizations:   Flu: Recommended annually  Shingrix: 1/2 shingrix completed   Tdap: Last in 3/2022  -Following with dentistry and optometry regularly  -Colon cancer screening: Colonoscopy in 2023, repeat in 2028  -Mammogram: Completed in 3/2025  -DEXA:  Last in 3/2023, repeat study recommended 3/2026  -GYN: last seen by Dr. Kumari in  4/2023  -Some concerns for anxiety/depression  -Tobacco never, EtOH none, No illicit/recreational drugs  -Feeling  safe at home           Other Visit Diagnoses       HSV-1 infection        Relevant Medications    acyclovir (Zovirax) 5 % ointment    valACYclovir (Valtrex) 1 gram tablet    Dermatitis        Relevant Medications    halobetasol (UltraVATE) 0.05 % cream            All pertinent lab work and results were reviewed with patient.     Follow up with me in 6 months for HTN    Mary Heart, APRN-CNS

## 2025-05-05 DIAGNOSIS — T78.40XA ALLERGY, INITIAL ENCOUNTER: ICD-10-CM

## 2025-05-05 NOTE — TELEPHONE ENCOUNTER
Med refill on  prescription    albuterol 90 mcg/actuation inhaler [[id number]]     Pharmacy    [facility] [id number] [place], [place]

## 2025-05-06 RX ORDER — ALBUTEROL SULFATE 90 UG/1
2 INHALANT RESPIRATORY (INHALATION) EVERY 4 HOURS PRN
Qty: 18 G | Refills: 1 | Status: SHIPPED | OUTPATIENT
Start: 2025-05-06

## 2025-09-19 ENCOUNTER — APPOINTMENT (OUTPATIENT)
Dept: PRIMARY CARE | Facility: CLINIC | Age: 65
End: 2025-09-19
Payer: COMMERCIAL